# Patient Record
Sex: FEMALE | Race: WHITE
[De-identification: names, ages, dates, MRNs, and addresses within clinical notes are randomized per-mention and may not be internally consistent; named-entity substitution may affect disease eponyms.]

---

## 2017-06-22 ENCOUNTER — HOSPITAL ENCOUNTER (OUTPATIENT)
Dept: HOSPITAL 25 - OREAST | Age: 67
Discharge: HOME | End: 2017-06-22
Attending: ORTHOPAEDIC SURGERY
Payer: MEDICARE

## 2017-06-22 VITALS — SYSTOLIC BLOOD PRESSURE: 118 MMHG | DIASTOLIC BLOOD PRESSURE: 62 MMHG

## 2017-06-22 DIAGNOSIS — F17.210: ICD-10-CM

## 2017-06-22 DIAGNOSIS — G40.909: ICD-10-CM

## 2017-06-22 DIAGNOSIS — Z88.5: ICD-10-CM

## 2017-06-22 DIAGNOSIS — Z79.82: ICD-10-CM

## 2017-06-22 DIAGNOSIS — Z88.1: ICD-10-CM

## 2017-06-22 DIAGNOSIS — M72.0: Primary | ICD-10-CM

## 2017-06-22 PROCEDURE — 88305 TISSUE EXAM BY PATHOLOGIST: CPT

## 2017-06-23 NOTE — OP
DATE OF OPERATION:  06/22/17 - [facility]

 

DATE OF BIRTH:  [birth year]

 

SURGEON:  Sunil Hernández MD

 

ASSISTANT:  None.

 

ANESTHESIOLOGIST:  Dr. Barnes.

 

ANESTHESIA:  General.

 

PRE-OP DIAGNOSIS:  Right palm mass.

 

POST-OP DIAGNOSIS:  Right palm mass.

 

OPERATIVE PROCEDURE:  Excision of right palm fascial mass, consistent with 
Dupuytren's nodule.

 

INDICATIONS:  Alexandria is a 67-year-old female who has a right palm nodule near 
the origin of the thenar muscles.  It was very symptomatic to her.  When she 
would use her cane, it would cause her lot of discomfort.  She also says that 
occasionally it would get bluish in appearance.  I sent her for an MRI, which 
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  I talked to her about just 
observing it versus excising it.  She said it was very symptomatic and she 
wanted it out, so we discussed the risks and benefits including the risk of 
damage to the motor branch and she wanted to proceed.

 

ESTIMATED BLOOD LOSS:  2 mL.

 

COMPLICATIONS:  None.

 

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

 

DESCRIPTION OF PROCEDURE:  Alexandria was seen in the preoperative holding area.  
The correct side, site, and procedure were identified.  We came back to the 
operating room, where the arm was prepped and draped in the usual fashion and 
formal time-out was performed.

 

I made a V-shaped incision and raised a full-thickness flap right off of the 
nodule.  It was indeed a thickened area of the palmar fascia.  I found clean 
healthy margins and began the excision proximally.  It was raised deeply 
straight off the subcutaneous tissue taking great care to preserve any deep 
neurovascular structures.  Once the mass was fully excised, I went ahead and 
passed it off.  The wound was copiously irrigated.  I excised just a bit more 
of the palmar fascia proximally.  I was very satisfied with the excision at 
this point, so I went ahead and irrigated out the wound.  The skin was closed 
with 4-0 nylon suture.  The operative area was infiltrated with 0.25% Marcaine.
  Wound was dressed with Xeroform, 4x4, sterile Webril and an Ace wrap.  
Tourniquet was deflated.  The arm had been exsanguinated with the Esmarch and 
the tourniquet inflated to 250 mmHg prior to making skin incision.  The hand 
pinked up immediately.  She was then taken to recovery room in stable condition.

 

 [id number]/[facility] #: [id number]

DONNA

## 2018-09-19 ENCOUNTER — HOSPITAL ENCOUNTER (EMERGENCY)
Dept: HOSPITAL 25 - ED | Age: 68
Discharge: HOME | End: 2018-09-19
Payer: MEDICARE

## 2018-09-19 VITALS — DIASTOLIC BLOOD PRESSURE: 55 MMHG | SYSTOLIC BLOOD PRESSURE: 102 MMHG
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
## 2018-09-19 DIAGNOSIS — M25.532: Primary | ICD-10-CM
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
## 2018-09-19 DIAGNOSIS — Z88.3: ICD-10-CM

## 2018-09-19 DIAGNOSIS — F17.210: ICD-10-CM

## 2018-09-19 DIAGNOSIS — Z88.5: ICD-10-CM

## 2018-09-19 PROCEDURE — 99281 EMR DPT VST MAYX REQ PHY/QHP: CPT

## 2018-09-19 NOTE — ED
Upper Extremity Pain





- HPI Summary


HPI Summary: 





Patient is a 68-year-old female presenting to the ED with a left wrist injury.  

She states she was mowing the lawn approximately 1 hour PTA when she had a 

FOOSH injury and is identifying pain directly over the dorsum of the wrist.  

She states she is unable to flex or extend at the wrist.  Denies any forearm or 

elbow pain.  Denies any pain to the MCP joints or the fingertips.  Denies any 

pain to the thumb.  Denies any numbness or tingling, no color temperature 

changes are noted.  She has never injured this area before.  She has not taken 

medications PTA.





- History of Current Complaint


Chief Complaint: EDExtremityUpper


Stated Complaint: LT WRIST INJURY


Time Seen by Provider: 09/19/18 18:23


Hx Obtained From: Patient


Mechanism Of Injury: Other - FOOSH injury


Onset/Duration: Started Minutes Ago


Timing: Constant


Severity Initially: Moderate


Severity Currently: Moderate


Pain Location: Wrist


Character: Aching


Aggravating Factor(s): Movement, Lifting, Extension


Alleviating Factor(s): Rest, Ice


Associated Signs & Symptoms: Negative: Swelling, Redness, Bruising, Numbness/

Tingling, Chest Pain, Diaphoresis, Nausea, Vomiting


Related History: Dominant Hand Right





- Risk Factors


Non-Orthopedic Risk Factor: Negative


DVT Risk Factors: Negative


Septic Arthritis Risk Factor: Negative


Compartment Syndrome Risk Factors: Pain





- Allergies/Home Medications


Allergies/Adverse Reactions: 


 Allergies











Allergy/AdvReac Type Severity Reaction Status Date / Time


 


MS Codeine [Codeine] AdvReac Mild GI Upset Verified 05/22/17 15:59


 


MS Erythromycin AdvReac Mild GI Upset Verified 05/22/17 15:59





[Erythromycin]     














PMH/Surg Hx/FS Hx/Imm Hx


Previously Healthy: Yes


Endocrine/Hematology History: 


   Denies: Hx Anticoagulant Therapy, Hx Diabetes, Hx Thyroid Disease


Cardiovascular History: Reports: Other Cardiovascular Problems/Disorders - 

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


   Denies: Hx Angina, Hx Coronary Artery Disease, Hx Hypercholesterolemia, Hx 

Hypertension, Hx Myocardial Infarction, Hx Pacemaker/ICD, Hx Peripheral 

Vascular Disease, Hx Valvular Heart Disease


Respiratory History: Reports: Hx Sleep Apnea


   Denies: Hx Asthma, Hx Chronic Obstructive Pulmonary Disease (COPD)


GI History: Reports: Hx Gall Bladder Disease - Cholecystectomy, Hx 

Gastroesophageal Reflux Disease, Hx Irritable Bowel, Other GI Disorders - 

ULCERATIVE COLITIS


   Denies: Hx Ulcer


 History: 


   Denies: Hx Renal Disease


Musculoskeletal History: 


   Denies: Hx Arthritis, Hx Osteoporosis


Sensory History: Reports: Hx Cataracts - bilateral IOL, Hx Contacts or Glasses 

- glasses


   Denies: Hx Glaucoma, Hx Hearing Aid


Opthamlomology History: Reports: Hx Cataracts - bilateral IOL, Hx Contacts or 

Glasses - glasses


   Denies: Hx Glaucoma


Neurological History: Reports: Hx Headaches, Hx Migraine - on meds, Hx Seizures 

- on meds, Other Neuro Impairments/Disorders - hx of suicide attempts - last 

one 4/19/16


   Denies: Hx Dementia, Hx Transient Ischemic Attacks (TIA)


Psychiatric History: Reports: Hx Anxiety - on meds, Hx Depression - on meds, Hx 

Inpatient Treatment, Hx Suicide Attempt


   Denies: Hx Eating Disorder, Hx Panic Disorder, Hx of Violent Episodes 

Against Others, Hx Substance Abuse





- Cancer History


Hx Chemotherapy: No


Hx Radiation Therapy: No





- Surgical History


Surgery Procedure, Year, and Place: Tracheostomy 1971.  Hysterectomy 1993   

ADDITIONAL ABD SURG POST HYSTERECTOMY COMPLICATIONS - ADHESIONS.  

Cholecystectomy 1993.  UTERINE SUSPENSION - 1970s.  BILATERAL CATATRACTS - 

2010.  CARDIAC CATH - NO STENTS - 1993 - CAUTERIZE NODE ON HEART


Hx Anesthesia Reactions: No





- Immunization History


Hx Pertussis Vaccination: No


Immunizations Up to Date: Yes


Infectious Disease History: No


Infectious Disease History: 


   Denies: Hx Hepatitis, Hx Human Immunodeficiency Virus (HIV), Traveled 

Outside the [place] in Last 30 Days





- Family History


Known Family History: Positive: None, Other - breast cancer





- Social History


Occupation: Unemployed


Lives: With Family


Alcohol Use: Rare


Hx Substance Use: No


Substance Use Type: Reports: None


Hx Tobacco Use: Yes


Smoking Status (MU): Light Every Day Tobacco Smoker


Type: Cigarettes


Amount Used/How Often: 1/2 ppd for 40 years


Have You Smoked in the Last Year: Yes





Review of Systems


Constitutional: Negative


Negative: Fever, Chills, Fatigue, Skin Diaphoresis


Negative: Palpitations, Chest Pain


Negative: Shortness Of Breath, Cough


Genitourinary: Negative


Positive: no symptoms reported, see HPI


Positive: Arthralgia - left wrist pain


Positive: Other


Neurological: Negative


All Other Systems Reviewed And Are Negative: Yes





Physical Exam


Triage Information Reviewed: Yes


Vital Signs On Initial Exam: 


 Initial Vitals











Temp Pulse Resp BP Pulse Ox


 


 97.2 F   76   18   102/55   97 


 


 09/19/18 18:31  09/19/18 18:31  09/19/18 18:31  09/19/18 18:31  09/19/18 18:31











Vital Signs Reviewed: Yes


Appearance: Positive: Well-Appearing, Well-Nourished


Skin: Positive: Warm, Skin Color Reflects Adequate Perfusion


Head/Face: Positive: Normal Head/Face Inspection


Eyes: Positive: EOMI, SRIDEVI, Conjunctiva Clear


Neck: Positive: Supple, No Lymphadenopathy


Respiratory/Lung Sounds: Positive: Clear to Auscultation, Breath Sounds Present


Cardiovascular: Positive: RRR, Pulses are Symmetrical in both Upper and Lower 

Extremities


Musculoskeletal: Positive: Pain @ - left dorsum of the wrist - unable to flex 

or extend d/t pain - good cap refill/ pulses +2 intact bilaterally


Neurological: Positive: Speech Normal


Psychiatric: Positive: Affect/Mood Appropriate


AVPU Assessment: Alert





Diagnostics





- Vital Signs


 Vital Signs











  Temp Pulse Resp BP Pulse Ox


 


 09/19/18 18:31  97.2 F  76  18  102/55  97














- Laboratory


Lab Statement: Any lab studies that have been ordered have been reviewed, and 

results considered in the medical decision making process.





- Radiology


  ** No standard instances


Xray Interpretation: No Acute Changes


Radiology Interpretation Completed By: ED Physician - Read by KATTY Rodríguez.  Negative for any acute fracture.





Course/Dx





- Course


Course Of Treatment: Patient is evaluated for left wrist injury.  Left wrist x-

ray obtained and read by KATTY Rodríguez as negative for any acute fracture.  

She will still be placed in a cockup wrist splint at this time and will follow-

up with orthopedics if any symptoms worsen.  I have also discussed with the 

patient we will call tomorrow morning if there is any discrepancy with the 

radiology read report in the a.m.  She is given ibuprofen with minimal 

improvement.





- Diagnoses


Differential Diagnosis/HQI/PQRI: Positive: Fracture (Closed), Strain, Sprain


Provider Diagnoses: 


 Pain in left wrist








Discharge





- Sign-Out/Discharge


Documenting (check all that apply): Patient Departure





- Discharge Plan


Condition: Stable


Disposition: HOME


Referrals: 


Max Syed MD [Primary Care Provider] - 


Sunil Hernández MD [Medical Doctor] - 


Additional Instructions: 


Please follow-up with Orthopedics if any symptoms worsen


Keep the splint applied for comfort


Ice to the area


Ibuprofen 600 mg 3 times daily





- Billing Disposition and Condition


Condition: STABLE


Disposition: Home

## 2018-09-20 NOTE — RAD
Indication: Left wrist pain



3 views of the wrist demonstrates no fracture. No other bone or joint abnormality is

identified.



IMPRESSION: NO FRACTURE OF THE WRIST IS NOTED.



R0

## 2018-12-01 ENCOUNTER — HOSPITAL ENCOUNTER (OUTPATIENT)
Dept: HOSPITAL 25 - ED | Age: 68
Setting detail: OBSERVATION
LOS: 2 days | Discharge: HOME | End: 2018-12-03
Attending: INTERNAL MEDICINE | Admitting: HOSPITALIST
Payer: MEDICARE

## 2018-12-01 DIAGNOSIS — Z87.19: ICD-10-CM

## 2018-12-01 DIAGNOSIS — R19.7: ICD-10-CM

## 2018-12-01 DIAGNOSIS — G43.909: ICD-10-CM

## 2018-12-01 DIAGNOSIS — E78.5: ICD-10-CM

## 2018-12-01 DIAGNOSIS — F32.9: ICD-10-CM

## 2018-12-01 DIAGNOSIS — Z90.710: ICD-10-CM

## 2018-12-01 DIAGNOSIS — Z79.82: ICD-10-CM

## 2018-12-01 DIAGNOSIS — F17.210: ICD-10-CM

## 2018-12-01 DIAGNOSIS — R10.9: ICD-10-CM

## 2018-12-01 DIAGNOSIS — Z88.6: ICD-10-CM

## 2018-12-01 DIAGNOSIS — R55: Primary | ICD-10-CM

## 2018-12-01 DIAGNOSIS — G40.909: ICD-10-CM

## 2018-12-01 LAB
BASOPHILS # BLD AUTO: 0.1 10^3/UL (ref 0–0.2)
EOSINOPHIL # BLD AUTO: 0.1 10^3/UL (ref 0–0.6)
HCT VFR BLD AUTO: 44 % (ref 35–47)
HGB BLD-MCNC: 14.9 G/DL (ref 12–16)
LYMPHOCYTES # BLD AUTO: 1.2 10^3/UL (ref 1–4.8)
MCH RBC QN AUTO: 33 PG (ref 27–31)
MCHC RBC AUTO-ENTMCNC: 34 G/DL (ref 31–36)
MCV RBC AUTO: 97 FL (ref 80–97)
MONOCYTES # BLD AUTO: 0.5 10^3/UL (ref 0–0.8)
NEUTROPHILS # BLD AUTO: 4.8 10^3/UL (ref 1.5–7.7)
NRBC # BLD AUTO: 0 10^3/UL
NRBC BLD QL AUTO: 0
PLATELET # BLD AUTO: 193 10^3/UL (ref 150–450)
RBC # BLD AUTO: 4.54 10^6/UL (ref 4–5.4)
WBC # BLD AUTO: 6.7 10^3/UL (ref 3.5–10.8)
WBC UR QL AUTO: (no result)

## 2018-12-01 PROCEDURE — 96374 THER/PROPH/DIAG INJ IV PUSH: CPT

## 2018-12-01 PROCEDURE — 96375 TX/PRO/DX INJ NEW DRUG ADDON: CPT

## 2018-12-01 PROCEDURE — 74176 CT ABD & PELVIS W/O CONTRAST: CPT

## 2018-12-01 PROCEDURE — 81015 MICROSCOPIC EXAM OF URINE: CPT

## 2018-12-01 PROCEDURE — 99406 BEHAV CHNG SMOKING 3-10 MIN: CPT

## 2018-12-01 PROCEDURE — 83690 ASSAY OF LIPASE: CPT

## 2018-12-01 PROCEDURE — 84146 ASSAY OF PROLACTIN: CPT

## 2018-12-01 PROCEDURE — 83735 ASSAY OF MAGNESIUM: CPT

## 2018-12-01 PROCEDURE — 96372 THER/PROPH/DIAG INJ SC/IM: CPT

## 2018-12-01 PROCEDURE — 36415 COLL VENOUS BLD VENIPUNCTURE: CPT

## 2018-12-01 PROCEDURE — 70496 CT ANGIOGRAPHY HEAD: CPT

## 2018-12-01 PROCEDURE — 96376 TX/PRO/DX INJ SAME DRUG ADON: CPT

## 2018-12-01 PROCEDURE — 87086 URINE CULTURE/COLONY COUNT: CPT

## 2018-12-01 PROCEDURE — 86140 C-REACTIVE PROTEIN: CPT

## 2018-12-01 PROCEDURE — 81003 URINALYSIS AUTO W/O SCOPE: CPT

## 2018-12-01 PROCEDURE — 93005 ELECTROCARDIOGRAM TRACING: CPT

## 2018-12-01 PROCEDURE — 84443 ASSAY THYROID STIM HORMONE: CPT

## 2018-12-01 PROCEDURE — 84100 ASSAY OF PHOSPHORUS: CPT

## 2018-12-01 PROCEDURE — 95816 EEG AWAKE AND DROWSY: CPT

## 2018-12-01 PROCEDURE — 80053 COMPREHEN METABOLIC PANEL: CPT

## 2018-12-01 PROCEDURE — G0378 HOSPITAL OBSERVATION PER HR: HCPCS

## 2018-12-01 PROCEDURE — 83605 ASSAY OF LACTIC ACID: CPT

## 2018-12-01 PROCEDURE — 93306 TTE W/DOPPLER COMPLETE: CPT

## 2018-12-01 PROCEDURE — 84484 ASSAY OF TROPONIN QUANT: CPT

## 2018-12-01 PROCEDURE — 96361 HYDRATE IV INFUSION ADD-ON: CPT

## 2018-12-01 PROCEDURE — 82150 ASSAY OF AMYLASE: CPT

## 2018-12-01 PROCEDURE — 85025 COMPLETE CBC W/AUTO DIFF WBC: CPT

## 2018-12-01 PROCEDURE — 85379 FIBRIN DEGRADATION QUANT: CPT

## 2018-12-01 PROCEDURE — 99284 EMERGENCY DEPT VISIT MOD MDM: CPT

## 2018-12-01 PROCEDURE — 70498 CT ANGIOGRAPHY NECK: CPT

## 2018-12-01 RX ADMIN — ALPRAZOLAM SCH MG: 0.5 TABLET ORAL at 20:05

## 2018-12-01 RX ADMIN — HEPARIN SODIUM SCH UNITS: 5000 INJECTION INTRAVENOUS; SUBCUTANEOUS at 20:27

## 2018-12-01 RX ADMIN — MIRTAZAPINE SCH MG: 15 TABLET, FILM COATED ORAL at 20:05

## 2018-12-01 RX ADMIN — TOPIRAMATE SCH MG: 25 TABLET, FILM COATED ORAL at 20:05

## 2018-12-01 RX ADMIN — ACETAMINOPHEN PRN MG: 325 TABLET ORAL at 21:57

## 2018-12-01 NOTE — ED
Abdominal Pain/Female





- HPI Summary


HPI Summary: 


Patient is a 69 y/o F brought in by ambulance with complaints of lower 

abdominal pain, loose stools, nausea and syncopal episode. She reports Hx of 

chronic abdominal pain, woke up with severe abdominal pain this morning that is 

similar to her chronic pain. Pain is described as cramping. Patient states that 

she was going to the kitchen to get her dogs water, states that she woke up on 

the floor. She states that she is unsure of duration of syncope. Patient called 

EMS. EMS reported , o2 100. Chest pain, SOB, blurred vision is denied. 

Patient states she has received multiple diagnoses for her abdominal pain from 

multiple doctors, including IBS, GERD, and "something-colitis". She notes HA, 

but reports Hx of headaches. In the room, pain is rated 9/10. Patient is unsure 

of date of her last CT scan. On triage, nothing is noted to aggravate/alleviate 

Sx. Home medications and allergies are reviewed. 





- History of Current Complaint


Stated Complaint: ABD PAIN


Hx Obtained From: Patient


Onset/Duration: Lasting Hours - episode onset this morning, Still Present


Timing: Hours - episode onset this morning


Severity Currently: Severe - 9/10


Pain Intensity: 9


Pain Scale Used: 0-10 Numeric - 9/10


Location: Other - lower abdomen


Character: Cramping


Aggravating Factor(s): Nothing


Alleviating Factor(s): Nothing


Associated Signs and Symptoms: Positive: Nausea, Other: - POSITIVE - LOOSE 

STOOLS, SYNCOPAL EPISODE, HA; NEGATIVE - SOB, BLURRED VISION.  Negative: Chest 

Pain


Allergies/Adverse Reactions: 


 Allergies











Allergy/AdvReac Type Severity Reaction Status Date / Time


 


codeine AdvReac  GI Upset Verified 12/01/18 17:14


 


erythromycin base AdvReac  GI Upset Verified 12/01/18 17:14











Home Medications: 


 Home Medications





Pantoprazole TAB (NF) [Protonix TAB (NF)] 40 mg PO DAILY 12/01/18 [History 

Confirmed 12/01/18]











PMH/Surg Hx/FS Hx/Imm Hx


Endocrine/Hematology History: 


   Denies: Hx Anticoagulant Therapy, Hx Diabetes, Hx Thyroid Disease


Cardiovascular History: Reports: Other Cardiovascular Problems/Disorders - 

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


   Denies: Hx Angina, Hx Coronary Artery Disease, Hx Hypercholesterolemia, Hx 

Hypertension, Hx Myocardial Infarction, Hx Pacemaker/ICD, Hx Peripheral 

Vascular Disease, Hx Valvular Heart Disease


Respiratory History: Reports: Hx Sleep Apnea


   Denies: Hx Asthma, Hx Chronic Obstructive Pulmonary Disease (COPD)


GI History: Reports: Hx Gall Bladder Disease - Cholecystectomy, Hx 

Gastroesophageal Reflux Disease, Hx Irritable Bowel, Other GI Disorders - 

ULCERATIVE COLITIS


   Denies: Hx Ulcer


 History: 


   Denies: Hx Renal Disease


Musculoskeletal History: 


   Denies: Hx Arthritis, Hx Osteoporosis


Sensory History: Reports: Hx Cataracts - bilateral IOL, Hx Contacts or Glasses 

- glasses


   Denies: Hx Glaucoma, Hx Hearing Aid


Opthamlomology History: Reports: Hx Cataracts - bilateral IOL, Hx Contacts or 

Glasses - glasses


   Denies: Hx Glaucoma


Neurological History: Reports: Hx Headaches, Hx Migraine - on meds, Hx Seizures 

- on meds, Other Neuro Impairments/Disorders - hx of suicide attempts - last 

one 4/19/16


   Denies: Hx Dementia, Hx Transient Ischemic Attacks (TIA)


Psychiatric History: Reports: Hx Anxiety - on meds, Hx Depression - on meds, Hx 

Inpatient Treatment, Hx Suicide Attempt


   Denies: Hx Eating Disorder, Hx Panic Disorder, Hx of Violent Episodes 

Against Others, Hx Substance Abuse





- Cancer History


Hx Chemotherapy: No


Hx Radiation Therapy: No





- Surgical History


Surgery Procedure, Year, and Place: Tracheostomy 1971.  Hysterectomy 1993   

ADDITIONAL ABD SURG POST HYSTERECTOMY COMPLICATIONS - ADHESIONS.  

Cholecystectomy 1993.  UTERINE SUSPENSION - 1970s.  BILATERAL CATATRACTS - 

2010.  CARDIAC CATH - NO STENTS - 1993 - CAUTERIZE NODE ON HEART


Hx Anesthesia Reactions: No


Infectious Disease History: 


   Denies: Hx Hepatitis, Hx Human Immunodeficiency Virus (HIV)





- Family History


Known Family History: Positive: Other - breast cancer





- Social History


Alcohol Use: Rare


Hx Substance Use: No


Substance Use Type: Reports: None


Hx Tobacco Use: Yes


Smoking Status (MU): Light Every Day Tobacco Smoker


Type: Cigarettes


Amount Used/How Often: 1/2 ppd for 40 years


Have You Smoked in the Last Year: Yes





Review of Systems


Negative: Blurred Vision


Negative: Chest Pain


Negative: Shortness Of Breath


Positive: Abdominal Pain, Nausea, Other - POSITIVE - LOOSE STOOLS 


Positive: Headache, Syncope


All Other Systems Reviewed And Are Negative: Yes





Physical Exam





- Summary


Physical Exam Summary: 


VITAL SIGNS: Reviewed. 


GENERAL: Patient is a well-developed and nourished female who is lying 

comfortable in the stretcher. Patient is not in any acute respiratory distress. 


HEAD AND FACE: Normocephalic and atraumatic. 


EYES: PERRLA, EOMI x 2, No injected conjunctiva.


EARS: Hearing grossly intact. Ear canals and tympanic membranes are WNL.


MOUTH: Oropharynx within normal limits. 


NECK: Supple, trachea is midline, no adenopathy, no JVD.


CHEST: Symmetric, no tenderness at palpation 


LUNGS: Clear to auscultation bilaterally. No wheezing or crackles.


CVS: RRR, S1 and S2 present, no murmurs or gallops appreciated. 


ABDOMEN: Soft, diffuse abdominal tender. No signs of distention. Positive bowel 

sounds. No rebound no guarding, and no masses palpated. No abdominal bruit or 

pulsations. 


EXTREMITIES: FROM in all major joints, no edema, no cyanosis or clubbing.


NEURO: Alert and oriented x 3. No acute neurological deficits. Speech is normal.


SKIN: Dry and warm








Triage Information Reviewed: Yes


Vital Signs On Initial Exam: 





 Initial Vitals











Pulse BP Pulse Ox


 


 58   125/72   95 


 


 12/01/18 14:26  12/01/18 14:26  12/01/18 14:26











Vital Signs Reviewed: Yes





Diagnostics





- Laboratory


Result Diagrams: 


 12/01/18 14:38





 12/01/18 14:38


Lab Statement: Any lab studies that have been ordered have been reviewed, and 

results considered in the medical decision making process.





- CT


  ** ABD/PEL CT


CT Interpretation Completed By: Radiologist


Summary of CT Findings: IMPRESSION:  1. NO EVIDENCE FOR ACUTE FINDING.  2. 

STATUS POST CHOLECYSTECTOMY THERE IS INTRA AND EXTRAHEPATIC DUCTAL DISTENTION 

WHICH IS.  UNCHANGED.  3. COMPLEX CYSTIC AND SOLID LESION IN THE LEFT KIDNEY. 

RECOMMEND AN OUTPATIENT MULTIPHASE.  CONTRAST-ENHANCED CT OF THE KIDNEYS FOR 

FURTHER EVALUATION.  4. BILATERAL NONOBSTRUCTING RENAL CALCULI.  5. STATUS POST 

HYSTERECTOMY.  THIS REPORT WAS REVIEWED BY ED PHYSICIAN.





- EKG


  ** 1442


Cardiac Rate: Bradycardia - rate of 52 bpm


EKG Rhythm: Sinus Bradycardia


EKG Comparison: No Significant Change - similar to EKG from 12/16/16


Summary of EKG Findings: EKG showed sinus bradycardia with rate of 52 bpm, no 

ST elevation, similar to EKG from 12/16/16





Abdominal Pain Fem Course/Dx





- Course


Course Of Treatment: Patient is a 69 y/o F brought in by ambulance with 

complaints of lower abdominal pain, loose stools, nausea and syncopal episode. 

She reports Hx of chronic abdominal pain, woke up with severe abdominal pain 

this morning that is similar to her chronic pain. Pain is described as 

cramping. Patient states that she was going to the kitchen to get her dogs water

, states that she woke up on the floor. She states that she is unsure of 

duration of syncope. Patient called EMS. EMS reported , o2 100. Chest pain

, SOB, blurred vision is denied. Patient states she has received multiple 

diagnoses for her abdominal pain from multiple doctors, including IBS, GERD, 

and "something-colitis". She notes HA, but reports Hx of headaches. In the room

, pain is rated 9/10. Patient is unsure of date of her last CT scan. On triage, 

nothing is noted to aggravate/alleviate Sx. Home medications and allergies are 

reviewed.  Blood work without any significant abnormality except for glucose 116

, lipase 165.  Abdominal pelvic CT impression: IMPRESSION:  1. NO EVIDENCE FOR 

ACUTE FINDING.  2. STATUS POST CHOLECYSTECTOMY THERE IS INTRA AND EXTRAHEPATIC 

DUCTAL DISTENTION WHICH IS.  UNCHANGED.  3. COMPLEX CYSTIC AND SOLID LESION IN 

THE LEFT KIDNEY. RECOMMEND AN OUTPATIENT MULTIPHASE.  CONTRAST-ENHANCED CT OF 

THE KIDNEYS FOR FURTHER EVALUATION.  4. BILATERAL NONOBSTRUCTING RENAL CALCULI.

  5. STATUS POST HYSTERECTOMY.  It seems that the etiology of the pain may be 

due to that patient is developing acute pancreatitis.  The patient has been 

given 2 doses of morphine, zofran for the nausea and vomiting.  Since the 

patient continues to have pain I discussed my physical exam and findings with 

Dr. Mora from the hospital services, Dr. Mora accepted the patient for 

admission. The patient is hemodynamically stable.





- Diagnoses


Provider Diagnoses: 


 Pancreatitis








- Provider Notifications


Discussed Care Of Patient With: Miesha Mora


Time Discussed With Above Provider: 15:55


Instructed by Provider To: Other - Patient's case was discussed with Dr. Mora. Dr. Mora accepts patient for admission.





Discharge





- Sign-Out/Discharge


Documenting (check all that apply): Patient Departure - admit 





- Discharge Plan


Condition: Stable


Disposition: ADMITTED TO [facility]


Referrals: 


Max Syed MD [Primary Care Provider] - 





- Billing Disposition and Condition


Condition: STABLE


Disposition: Admitted to [facility]





- Attestation Statements


Document Initiated by Scribe: Yes


Documenting Scribe: ROSI NAVARRO 


Provider For Whom Scribe is Documenting (Include Credential): PALMA RAVI MD


Scribe Attestation: 


I, ROSI NAVARRO , scribed for PALMA RAVI MD on 12/01/18 at 1838. 


Scribe Documentation Reviewed: Yes


Provider Attestation: 


The documentation as recorded by the [email or web address] lagos  accurately reflects 

the service I personally performed and the decisions made by me, PALMA RAVI MD


Status of Scribe Document: Viewed

## 2018-12-01 NOTE — XMS REPORT
Continuity of Care Document (CCD)

 Created on:2018



Patient:Alexandria Larios

Sex:Female

:1950

External Reference #:[id number]





Demographics







 Address  [address]



   [place] [address]

 

 Home Phone  [phone]

 

 Mobile Phone  [phone]

 

 Preferred Language  en

 

 Marital Status  Not  or 

 

 Hoahaoism Affiliation  Unknown

 

 Race  White

 

 Ethnic Group  Not  or 









Author







 Name  Shayy Forbes MD

 

 Address  [address]



   Unavailable



   [place] [address]









Care Team Providers







 Name  Role  Phone

 

 Max Syed MD  Care Team Information   Unavailable

 

 Max Syed MD  Primary Care Physician  Unavailable









Payers







 Type  Date  Identification Numbers  Payment Provider  Subscriber

 

     Policy Number: [id number]  Medicare  Alexandria Larios









 PayID: [id number]  [facility]









 [address]

 

 [facility] [place] [address]









     Policy Number: [id number]  Medicaid/Medicare  Alexandria Larios









 Group Name: 2 1  [facility] [employer]

 

 PayID: [id number]  [address]









 [place] [address]







Advance Directives







 Description

 

 No Information Available







Problems







 Description

 

 No Information







Family History







 Description

 

 No Information Available







Social History







 Type  Date  Description  Comments

 

 Birth Sex    Unknown  

 

 Tobacco Use  Start: Unknown  Patient is a current smoker, smokes every  



     day  

 

 Smoking Status  Reviewed: 18  Patient is a current smoker, smokes every  



     day  







Allergies, Adverse Reactions, Alerts







 Date  Description  Reaction  Status  Severity  Comments

 

 10/24/2018  Erythromycin    Active    

 

 11/15/2018  Codeine    Active    







Medications







 Medication  Date  Status  Form  Strength  Qnty  SIG  Indications  Ordering



                 Provider

 

 Pantoprazole  /  Active  Tablets DR  40mg  30tabs  take 1    Shayy



 Sodium  2018          tablet    Foor-Noble



             daily.    MD epifanio



             take    



             30-60    



             minutes    



             before    



             eating.    

 

 Rosuvastatin  10/20/  Active  Tablets  10mg  90tabs    E78.4  Suzette,



 Calcium                FAITH Jonas

 

 Aspirin Ec  10/20/  Active  Tablets DR  81mg      E78.4  Candie Syed



                 MD fatimah

 

 Topamax  /  Active  Tablets  50mg  60tabs      Unknown



   0000              

 

 Lomotil  /  Active  Tablets  2.5-0.025m        Unknown



   0000      g        

 

 Robinul  /  Active  Tablets  1mg        Unknown



   0000              

 

 Alprazolam  /  Active  Tablets  0.5mg        Unknown



   0000              

 

 Desvenlafaxine  /  Active  Tablets ER  100mg        Unknown



 ER  0000    24HR          

 

                 

 

 Dexilant  /  Hx  Capsules  60mg        Unknown



   0000 -    DR          



   2018              

 

 Amitriptyline  /  Hx  Tablets  50mg        Unknown



 HCL  0000 -              



   2018              







Immunizations







 Description

 

 No Information Available







Vital Signs







 Date  Vital  Result  Comment

 

 2018  1:21pm  Height  62 inches  5'2"









 Weight  143.00 lb  

 

 BP Systolic  114 mmHg  

 

 BP Diastolic  70 mmHg  

 

 Heart Rate  76 /min  

 

 BMI (Body Mass Index)  26.2 kg/m2  







Results







 Test  Date  Facility  Test  Result  H/L  Range  Note

 

 Laboratory test  2016  N2N/CCD Import  Acetaminophen  < 15 ug/mL      1



 finding              









 Alcohol  < 10 mg/dL    <10  

 

 Amphetamine Ur Screen  None Detected    None Detect  

 

 Barbiturates Urine Screen  None Detected    None Detect  

 

 Benzodiazepine Urine Screen  Presumptive Posi <See Note>    None Detect  2

 

 Salicylate  < 2.50 mg/dL    <30  

 

 TSH (Thyroid Stim Horm)  1.23 ?IU/mL    0.34-5.60  

 

 Urine Cannabinoids Screen  None Detected    None Detect  

 

 Urine Cocaine Screen  None Detected    None Detect  

 

 Urine Culture And Sensitivities  See Result Below      3

 

 Urine Opiates Screen  None Detected    None Detect  

 

 Urine Phencyclidine Screen  None Detected    None Detect  4









 CBC Auto Diff  2016  N2N/CCD Import  Abs Basophils  0.1 10^3/uL    0-0.2
  









 Abs Eosinophils  0.2 10^3/uL    0-0.6  

 

 Abs Lymphocytes  2.3 10^3/uL    1.0-4.8  

 

 Abs Monocytes  0.4 10^3/uL    0-0.8  

 

 Abs Neutrophils  5.4 10^3/uL    1.5-7.7  

 

 Abs Nucleated RBC  0.01 10^3/uL      

 

 Basophil %  0.7 %    0-2  

 

 Eosinophil %  2.0 %    0-6  

 

 Granulocyte %  65.0 %    38-83  

 

 Hematocrit  44 %    35-47  

 

 Hemoglobin  14.1 g/dL    12.0-16.0  

 

 Lymphocyte %  27.5 %    25-47  

 

 Mean Corpuscular HGB Conc  32 g/dL    31-36  

 

 Mean Corpuscular Hemoglobin  32 pg  High  27-31  

 

 Mean Corpuscular Volume  100 fL  High  80-97  

 

 Mean Platelet Volume  10 um3    7.4-10.4  

 

 Monocyte %  4.8 %    1-9  

 

 Nucleated Red Blood Cells %  0.1 1      

 

 Platelet Count  281 10^3/uL    150-450  

 

 Red Blood Count  4.39 10^6/uL    4.0-5.4  

 

 Red Cell Distribution Width  14 %    10.5-15  

 

 White Blood Count  8.4 10^3/uL    3.5-10.8  









 Comp Metabolic Panel  2016  [employer]/eFolder Import  Albumin  4.5 g/dL    3.2-5.2
  









 Albumin/Globulin Ratio  1.7 1    1-3  

 

 Alkaline Phosphatase  85 U/L      

 

 Alt  11 U/L    7-52  

 

 Anion Gap  7 mmol/L    2-11  

 

 Ast  16 U/L    13-39  

 

 BUN/Creatinine Ratio  10.8 1    8-20  

 

 Blood Urea Nitrogen  10 mg/dL    6-24  

 

 Calcium  9.0 mg/dL    8.6-10.3  

 

 Chloride  104 mmol/L    101-111  

 

 Co2 Carbon Dioxide  27 mmol/L    22-32  

 

 Creatinine  0.93 mg/dL    0.51-0.95  

 

 Egfr   78.1 1    >60  5

 

 Egfr Non-  60.7 1    >60  

 

 Globulin  2.6 g/dL    2-4  

 

 Glucose  116 mg/dL  High    

 

 Potassium  3.4 mmol/L  Low  3.5-5.0  

 

 Sodium  138 mmol/L    133-145  

 

 Total Bilirubin  0.50 mg/dL    0.2-1.0  

 

 Total Protein  7.1 g/dL    6.4-8.9  









 Urinalysis Profile  2016  Exos/eFolder Import  Urine Appearance  Clear      









 Urine Bacteria  Absent    Absent  

 

 Urine Bilirubin  Negative    Negative  

 

 Urine Blood  Negative    Negative  

 

 Urine Color  Yellow      

 

 Urine Glucose  Negative    Negative  

 

 Urine Ketones  Negative    Negative  

 

 Urine Leukocytes  2+    Negative  

 

 Urine Nitrite  Negative    Negative  

 

 Urine Protein  Negative    Negative  

 

 Urine Red Blood Cell  Trace(0-2/hpf)    Absent  

 

 Urine Specific Gravity  1.004 1  Low  1.010-1.030  

 

 Urine Squamous Epithelial Cell  Present    Absent  

 

 Urine Urobilinogen  Negative    Negative  

 

 Urine White Blood Cell  1+(6-10/hpf)    Absent  

 

 Urine pH  6.0 1    5-9  









 Laboratory test  2015  [employer]/eFolder Import  C Reactive  < 1.00 mg/L    < 5.00
  6



 finding      Protein        









 Partial Thrombo Time PTT  28.7 s    26.0-36.3  









 CBC Auto Diff  2015  [employer]/eFolder Import  Abs Basophils  0.1 10^3/uL    0-0.2
  









 Abs Eosinophils  0.2 10^3/uL    0-0.6  

 

 Abs Lymphocytes  2.7 10^3/uL    1.0-4.8  

 

 Abs Monocytes  0.5 10^3/uL    0-0.8  

 

 Abs Neutrophils  3.5 10^3/uL    1.5-7.7  

 

 Abs Nucleated RBC  0.01 10^3/uL      

 

 Basophil %  0.8 %    0-2  

 

 Eosinophil %  2.3 %    0-6  

 

 Granulocyte %  50.6 %    38-83  

 

 Hematocrit  42 %    35-47  

 

 Hemoglobin  13.9 g/dL    12.0-16.0  

 

 Lymphocyte %  39.1 %    25-47  

 

 Mean Corpuscular HGB Conc  33 g/dL    31-36  

 

 Mean Corpuscular Hemoglobin  33 pg  High  27-31  

 

 Mean Corpuscular Volume  99 fL  High  80-97  

 

 Mean Platelet Volume  10 um3    7.4-10.4  

 

 Monocyte %  7.2 %    1-9  

 

 Nucleated Red Blood Cells %  0.1 1      

 

 Platelet Count  244 10^3/uL    150-450  

 

 Red Blood Count  4.25 10^6/uL    4.0-5.4  

 

 Red Cell Distribution Width  13 %    10.5-15  

 

 White Blood Count  6.9 10^3/uL    3.5-10.8  









 Comp Metabolic Panel  2015  N2N/CCD Import  Albumin  4.1 g/dL    3.2-5.2
  









 Albumin/Globulin Ratio  1.8 1    1-3  

 

 Alkaline Phosphatase  86 U/L      

 

 Alt  8 U/L    7-52  

 

 Anion Gap  6 mmol/L    2-11  

 

 Ast  14 U/L    13-39  

 

 BUN/Creatinine Ratio  10.2 1    8-20  

 

 Blood Urea Nitrogen  9 mg/dL    6-24  

 

 Calcium  9.3 mg/dL    8.6-10.3  

 

 Chloride  103 mmol/L    101-111  

 

 Co2 Carbon Dioxide  26 mmol/L    22-32  

 

 Creatinine  0.88 mg/dL    0.51-0.95  

 

 Egfr   83.2 1    >60  7

 

 Egfr Non-  64.7 1    >60  

 

 Globulin  2.3 g/dL    2-4  

 

 Glucose  95 mg/dL      

 

 Potassium  3.2 mmol/L  Low  3.5-5.0  

 

 Sodium  135 mmol/L    133-145  

 

 Total Bilirubin  0.30 mg/dL    0.2-1.0  

 

 Total Protein  6.4 g/dL    6.4-8.9  









 Inr/Protime  2015  N2N/CCD Import  Inr  1.01 1    0.89-1.11  

 

 Laboratory test finding  12/10/2013  N2N/CCD Import  Amylase  36 U/L    
  









 C Reactive Protein  < 0.5 mg/dL    Less than 0.5  

 

 Lipase  13 U/L  Low  22-51  









 CBC Auto Diff  12/10/2013  N2N/CCD Import  Abs Basophils  0.1 10^3/uL    0-0.2
  









 Abs Eosinophils  0.1 10^3/uL    0-0.6  

 

 Abs Lymphocytes  1.5 10^3/uL    1.0-4.8  

 

 Abs Monocytes  0.4 10^3/uL    0-0.8  

 

 Abs Neutrophils  3.3 10^3/uL    1.5-7.7  

 

 Abs Nucleated RBC  0 10^3/uL      

 

 Basophil %  1.1 %    0-2  

 

 Eosinophil %  2.2 %    0-6  

 

 Granulocyte %  61.0 %    38-83  

 

 Hematocrit  39 %    35-47  

 

 Hemoglobin  13.2 g/dL    12.0-16.0  

 

 Lymphocyte %  27.5 %    25-47  

 

 Mean Corpuscular HGB Conc  34 g/dL    31-36  

 

 Mean Corpuscular Hemoglobin  32 pg  High  27-31  

 

 Mean Corpuscular Volume  95 fL    80-97  

 

 Mean Platelet Volume  9 um3    7.4-10.4  

 

 Monocyte %  8.2 %    1-9  

 

 Nucleated Red Blood Cells %  0.1 1      

 

 Platelet Count  225 10^3/uL    150-450  

 

 Red Blood Count  4.11 10^6/uL    4.0-5.4  

 

 Red Cell Distribution Width  13 %    10.5-15  

 

 White Blood Count  5.5 10^3/uL    4.8-10.8  









 Comp Metabolic Panel  12/10/2013  N2N/eFolder Import  Albumin  4.0 g/dL    3.2-5.2
  









 Albumin/Globulin Ratio  1.7 1    1-3  

 

 Alkaline Phosphatase  105 U/L      

 

 Alt  15 U/L    14-54  

 

 Anion Gap  7.0 mmol/L    2-11  

 

 Ast  26 U/L    12-42  

 

 BUN/Creatinine Ratio  8.8 1    8-20  

 

 Blood Urea Nitrogen  7 mg/dL    6-24  

 

 Calcium  9.1 mg/dL    8.1-9.9  

 

 Chloride  102 mmol/L    101-111  

 

 Co2 Carbon Dioxide  27.0 mmol/L    22-32  

 

 Creatinine  0.80 mg/dL    0.50-1.40  

 

 Egfr   93.5 1    >60  8

 

 Egfr Non-  72.7 1    >60  

 

 Globulin  2.3 g/dL    2-4  

 

 Glucose  92 mg/dL      

 

 Potassium  3.4 mmol/L  Low  3.5-5.0  

 

 Sodium  136 mmol/L    133-145  

 

 Total Bilirubin  0.7 mg/dL    0.4-1.5  

 

 Total Protein  6.3 g/dL    6.2-8.1  









 1  Therapeutic concentration: <50 ug/mL



   Toxic concentration: >120 ug/mL

 

 2  Presumptive Positive



   Presumptive positive results are unconfirmed.

 

 3  SEE RESULT BELOW



   -----------------------------------------------------------------------------
---------------



   Name:  ALEXANDRIA LARIOS                  : 1950    Attend Dr: Aubrey Lawler MD



   Acct:  [id number]  Unit: [id number]  AGE: 65            Location:  [facility]    
    



   Re16                        SEX: F             Status:    ADM IN



   -----------------------------------------------------------------------------
---------------



   



   SPEC: [id number]         REZA:       [facility] DR: Shari VALLE



   REQ:  [id number]              RECD:   



   STATUS: KAYLA FAGAN DR: Max Lindsay MD



   _



   SOURCE: URINE          SPDESC:



   ORDERED:  Urine Culture



   



   -----------------------------------------------------------------------------
---------------



   Procedure                         Result                         Reported   
        Site



   -----------------------------------------------------------------------------
---------------



   Urine Culture  Final                                             16-
[id number]      ML



   No Growth (<1,000 CFU/mL)



   



   -----------------------------------------------------------------------------
---------------



   * ML - MAIN LAB ([facility])



   .



   



   



   



   



   



   



   



   



   



   



   



   



   



   



   



   



   



   



   



   



   



   



   



   



   



   



   ** END OF REPORT **



   



   * ML=Testing performed at Main Lab



   DEPARTMENT OF PATHOLOGY,  [address] [place] [address]



   Phone # [phone]      Fax #[phone]



   Cornelius White M.D. Director     [facility] # [id number]

 

 4  The urine specimen was tested at the listed cutoffs:



   Drug class                       test level



   (ng/mL)



   Amphetamines                        500



   Barbiturates                        200



   Benzodiazepine metabolites          200



   Cocaine metabolites                 150



   Cannabinoids                         50



   Opiates                             300



   Pcp                                  25



   



   Specimen was received without chain of custody. Results



   should be used for medical purposes only.

 

 5  *******Because ethnic data is not always readily available,



   this report includes an eGFR for both -Americans and



   non- Americans.****



   The National Kidney Disease Education Program (NKDEP) does



   not endorse the use of the MDRD equation for patients that



   are not between the ages of 18 and 70, are pregnant, have



   extremes of body size, muscle mass, or nutritional status,



   or are non- or non-.



   According to the National Kidney Foundation, irrespective of



   diagnosis, the stage of the disease is based on the level of



   kidney function:



   Stage Description                      GFR(mL/min/1.73 m(2))



   1     Kidney damage with normal or decreased GFR       90



   2     Kidney damage with mild decrease in GFR          60-89



   3     Moderate decrease in GFR                         30-59



   4     Severe decrease in GFR                           15-29



   5     Kidney failure                       <15 (or dialysis)

 

 6  Acute inflammation:  >10.00

 

 7  *******Because ethnic data is not always readily available,



   this report includes an eGFR for both -Americans and



   non- Americans.****



   The National Kidney Disease Education Program (NKDEP) does



   not endorse the use of the MDRD equation for patients that



   are not between the ages of 18 and 70, are pregnant, have



   extremes of body size, muscle mass, or nutritional status,



   or are non- or non-.



   According to the National Kidney Foundation, irrespective of



   diagnosis, the stage of the disease is based on the level of



   kidney function:



   Stage Description                      GFR(mL/min/1.73 m(2))



   1     Kidney damage with normal or decreased GFR       90



   2     Kidney damage with mild decrease in GFR          60-89



   3     Moderate decrease in GFR                         30-59



   4     Severe decrease in GFR                           15-29



   5     Kidney failure                       <15 (or dialysis)

 

 8  *******Because ethnic data is not always readily available,



   this report includes an eGFR for both -Americans and



   non- Americans.****



   The National Kidney Disease Education Program (NKDEP) does



   not endorse the use of the MDRD equation for patients that



   are not between the ages of 18 and 70, are pregnant, have



   extremes of body size, muscle mass, or nutritional status,



   or are non- or non-.



   According to the National Kidney Foundation, irrespective of



   diagnosis, the stage of the disease is based on the level of



   kidney function:



   Stage Description                      GFR(mL/min/1.73 m(2))



   1     Kidney damage with normal or decreased GFR       90



   2     Kidney damage with mild decrease in GFR          60-89



   3     Moderate decrease in GFR                         30-59



   4     Severe decrease in GFR                           15-29



   5     Kidney failure                       <15 (or dialysis)







Procedures







 Date  Code  Description  Status

 

 2004  73518  Colonoscopy  Completed







Encounters







 Description

 

 No Information Available







Plan of Treatment

Future Appointment(s):02/15/2019  3:45 pm - Shayy Forbes MD at 
[id number] - Shayy Forbes MDK21.9 
Gastro-esophageal reflux disease without vlzaxrdvqcwJ59.7 Diarrhea, 
htcyhvmwxrhM59.00 Constipation, qvdolilsrvfQ22.010 Personal history of colonic 
polyps

## 2018-12-01 NOTE — HP
CC:  Dr. Syed.*

 

HOSPITAL MEDICINE HISTORY AND PHYSICAL:

 

DATE OF ADMISSION:  18

 

PRIMARY CARE PHYSICIAN:  Dr. Syed.

 

ATTENDING PHYSICIAN:  Dr. Miesha Mora * (dictation provided by Monika Meadows NP
).

 

CHIEF COMPLAINT:  "I found myself on the floor."

 

HISTORY OF PRESENT ILLNESS:  Ms. Porter is a 68-year-old female with a past 
medical history of depression, pseudoseizures, migraines, suicidal ideation, 
who presents to the hospital today with concern for an episode of finding 
herself on the floor of unclear circumstance.  Ms. Porter states that she has 
had severe depression for all of her life, but most notably it had been seemed 
more severe in the past few weeks.  For the past few days, she has noted 
increased diarrhea.  She states that she has an uncertain gastroenterological 
problem by which she will have episodes of constipation alternating with 
episodes of diarrhea.  She states that this episode seemed similar to those 
episodes, but that it seemed to last longer than usual. She has had some nausea 
and vomiting, which is also consistent with these episodes. She denies any 
chest pain, shortness of breath, fever.  She has been trying to increase her 
oral intake.  She states that she is quite sedentary and spends all day sitting 
in a chair reading.  She states that she feels quite lonely and has minimal of 
support systems in the community.  She does follow with a therapist on a weekly 
basis.  Today, she got up at about 1 o'clock to go fix herself something to eat 
and the next thing she knew she found herself on the floor.  She had knocked 
over her dog's bowl of water.  Within about 45 minutes from that time period, 
EMS had arrived to bring her to the hospital for evaluation.  The patient is 
unclear how long she was on the ground and seemingly unconscious, but it seems 
that it was no longer than 45 minutes in total.

 

In the emergency room, Ms. Porter had labs that were unremarkable.  She does 
have a slight elevation in her lipase to 165.  She is denying abdominal pain at 
this time, although she has complained of abdominal pain intermittently since 
arrival.  She did confirm that this abdominal pain was consistent with her 
previous history of chronic abdominal pain.  She had an abdomen and pelvis CT, 
which showed no acute abnormalities.

 

PAST MEDICAL HISTORY:

1.  Depression, suicidal ideation.

2.  Chronic abdominal pain with alternating periods of constipation and 
diarrhea consistent with possible diagnosis of irritable bowel syndrome.

3.  Migraines.

4.  Cholecystitis.

5.  Hysterectomy.

6.  History of nonepileptic seizures.

 

MEDICATIONS:

1.  Pantoprazole 40 mg p.o. daily.

2.  Desvenlafaxine 50 mg p.o. q.p.m.

3.  Aspirin 81 mg p.o. q.a.m.

4.  Alprazolam 0.5 mg p.o. b.i.d.

5.  Topamax 50 mg p.o. b.i.d.

6.  Rosuvastatin 10 mg p.o. q.p.m.

7.  Ondansetron 4 mg p.o. q.6 hours p.r.n.

8.  Mirtazapine 7.5 mg p.o. at bedtime.

9.  Lomotil 1 tab p.o. 4 times a day p.r.n.

 

ALLERGIES:  To CODEINE and ERYTHROMYCIN BASE.

 

FAMILY HISTORY:  The patient states her mother is alive and well at 95.  Her 
dad  related to emphysema.

 

SOCIAL HISTORY:  The patient is a half a pack a day smoker.  She denies any 
regular alcohol use and she states that she drinks only very occasionally.  No 
reported drug use.  She is unable to name her healthcare proxy.

 

REVIEW OF SYSTEMS:  A 14-point review of systems was completed with Ms. Porter 
and all those not mentioned above were negative.

 

                               PHYSICAL EXAMINATION

 

GENERAL:  Ms. Porter is lying in the bed.  She is in no acute distress.

 

VITAL SIGNS:  Temperature 97.7, pulse rate 55, respiratory rate 12, O2 
saturation 100% on room air, blood pressure 107/69.

 

HEART:  S1, S2.  No murmur, rub or gallop, and regular.

 

ABDOMEN:  Soft, nontender.  Bowel sounds positive x4.

 

EXTREMITIES:  No cyanosis or edema.

 

SKIN:  Intact.

 

NEURO:  She is alert.  She is oriented x3.  She moves all extremities equally. 
There is no facial asymmetry or focal weakness.  Extraocular movements are 
intact.

 

 DIAGNOSTIC STUDIES/LAB DATA:  Sodium 139, potassium 3.8, chloride 108, serum 
bicarbonate 25, BUN 13, creatinine 0.97, glucose 116.  Lactic acid 1.1.  
Troponin 0.00.  CRP less than 1.00, lipase 165. WBC 6.7, hemoglobin 14.9, 
hematocrit 44, platelet count 193.

 

CT abdomen and pelvis shows "no evidence for acute findings, status post 
cholecystectomy and there is intra and extrahepatic ductal distention, which is 
unchanged, complex cystic and solid lesion in the left kidney, recommend an 
outpatient multiphase contrast enhanced CT if they need for further evaluation. 
Bilateral nonobstructing renal calculi and status post hysterectomy."

 

EKG shows a sinus bradycardia with a heart rate about 50 and no evidence of 
ischemia.

 

ASSESSMENT:  Ms. Porter is a 68-year-old female with a past medical history of 
depression, suicidal ideation, likely irritable bowel syndrome, and 
pseudoseizures, who presents today to the hospital with concern for findings 
herself in the floor and possible syncopal episode.  Our plans are for 
observation in the hospital for the followin.  Syncope:  The patient does not remember the events leading up to findings 
herself from the floor.  It is possible that she had a syncopal episode, was 
concerning possibly this would be an arrhythmia.  She will be monitored on 
telemetry monitor overnight.  She has no evidence of infection.  No metabolic 
abnormalities.  Her vital signs are stable.  It is also possible that she could 
have a pseudoseizure or some other conversion type disorder given her complex 
psychiatric history.  She does endorse a recent history of worsening depressive 
symptoms.  She denies suicidal ideation, but states "I just wish sometimes I 
were not here."  I think she deserves evaluation by a psychiatric team for 
consideration of admission to mental health unit once her evaluation on the 
medical floor is complete.

2.  Hyperlipidemia:  Continue rosuvastatin.

3.  Diarrhea:  The patient reports a long term history of intermittent periods 
of constipation and diarrhea.  She states that her symptoms that she has now 
are consistent with previous.  Based on the symptomatology alone, it sounds 
like she may have irritable bowel syndrome.  I do note that she is following 
closely already [facility] and has 
plans for upper and lower endoscopy there in February.  I think no further 
workup is indicated at this time, but we will continue to evaluate and adjust 
that further if needed.

4.  DVT prophylaxis with heparin subcu.

5.  Code status is full code.

 

TIME SPENT:  Approximately 60 minutes was spent on the admission of this patient
, more than half of the time was spent with the patient at the bedside 
reviewing the events leading up to this hospitalization, performing the 
physical examination, and reviewing my plan of care.

 

 ____________________________________ MONIKA MEADOWS, NP

 

[id number]/[facility] #: [id number]

DONNA

## 2018-12-02 RX ADMIN — MIRTAZAPINE SCH MG: 15 TABLET, FILM COATED ORAL at 20:34

## 2018-12-02 RX ADMIN — TOPIRAMATE SCH MG: 25 TABLET, FILM COATED ORAL at 07:51

## 2018-12-02 RX ADMIN — HEPARIN SODIUM SCH UNITS: 5000 INJECTION INTRAVENOUS; SUBCUTANEOUS at 13:59

## 2018-12-02 RX ADMIN — MORPHINE SULFATE PRN MG: 4 INJECTION INTRAVENOUS at 13:59

## 2018-12-02 RX ADMIN — OMEPRAZOLE SCH MG: 20 CAPSULE, DELAYED RELEASE ORAL at 07:50

## 2018-12-02 RX ADMIN — ALPRAZOLAM SCH MG: 0.5 TABLET ORAL at 20:35

## 2018-12-02 RX ADMIN — TOPIRAMATE SCH MG: 25 TABLET, FILM COATED ORAL at 20:35

## 2018-12-02 RX ADMIN — CALCIUM POLYCARBOPHIL SCH MG: 625 TABLET, FILM COATED ORAL at 16:57

## 2018-12-02 RX ADMIN — HEPARIN SODIUM SCH UNITS: 5000 INJECTION INTRAVENOUS; SUBCUTANEOUS at 20:36

## 2018-12-02 RX ADMIN — ALPRAZOLAM SCH MG: 0.5 TABLET ORAL at 07:50

## 2018-12-02 RX ADMIN — HEPARIN SODIUM SCH UNITS: 5000 INJECTION INTRAVENOUS; SUBCUTANEOUS at 05:35

## 2018-12-02 RX ADMIN — MORPHINE SULFATE PRN MG: 4 INJECTION INTRAVENOUS at 20:36

## 2018-12-02 RX ADMIN — ASPIRIN SCH MG: 81 TABLET, CHEWABLE ORAL at 07:52

## 2018-12-02 NOTE — PN
Subjective


Date of Service: 12/02/18


Interval History: 





Pt seen and examined.  Meds and labs reviewed.  





CC: Depressed, but denies SI/HI





ROS:  Denied HA/dizziness, F/C, N/V, CP, SOB, increased cough, sputum production

, abd pain, diarrhea, constipation, dysuria, myalgias, arthralgias, throat pain

, and new skin lesions.  The rest of the 14 point ROS are unremarkable.





PHYSICAL EXAM:


GEN APPEARANCE: Awake, not in acute distress, Ox3


HEENT: NC/AT, PERRLA, moist oral mucosa, (-) throat erythema


NECK: Soft, supple, (-) cervical LAD, (-)JVD


HEART: S1S2 WNL, RRR, No MRG


CHEST: CTA, BL, GAE, No W/R/R


ABD: Soft, ND/NT, NABS 4x Q


EXT: No C/C/E


SKIN: Warm to touch


PSYCH: No active psychosis, hallucinations,  (+)Depression, (-)SI/HI





Objective


Active Medications: 








Acetaminophen (Tylenol Tab*)  650 mg PO Q6H PRN


   PRN Reason: PAIN


   Last Admin: 12/01/18 21:57 Dose:  650 mg


Alprazolam (Xanax Tab*)  0.5 mg PO BID [facility]


   Last Admin: 12/02/18 07:50 Dose:  0.5 mg


Aspirin (Aspirin 81 Mg Chew Tab*)  81 mg PO QAM [facility]


   Last Admin: 12/02/18 07:52 Dose:  81 mg


Atorvastatin Calcium (Lipitor*)  20 mg PO 1700 LYNN


Calcium Polycarbophil (Fibercon Tab*)  625 mg PO AC [facility]


Diphenoxylate HCl/Atropine (Lomotil Tab*)  1 tab PO QID PRN


   PRN Reason: DIARRHEA


Heparin Sodium (Porcine) (Heparin Vial(*))  5,000 units SUBCUT Q8HR [facility]


   Last Admin: 12/02/18 13:59 Dose:  5,000 units


Iodixanol (Visipaque* 320 (Contrast))  80 ml IV ONCE [facility]


   Stop: 12/04/18 10:50


   Last Admin: 12/02/18 12:11 Dose:  80 ml


Mirtazapine (Remeron Tab*)  7.5 mg PO BEDTIME [facility]


   Last Admin: 12/01/18 20:05 Dose:  7.5 mg


Morphine Sulfate (Morphine Vial*)  1 mg IV Q4H PRN


   PRN Reason: PAIN


   Last Admin: 12/02/18 13:59 Dose:  1 mg


Nicotine (Nicotine Inhaler*)  10 mg INH Q2H PRN


   PRN Reason: CRAVING


Omeprazole (Prilosec Cap*)  20 mg PO DAILY [facility]


   Last Admin: 12/02/18 07:50 Dose:  20 mg


Ondansetron HCl (Zofran Odt Tab*)  4 mg PO Q6H PRN


   PRN Reason: NAUSEA


Topiramate (Topamax(*))  50 mg PO BID [facility]


   Last Admin: 12/02/18 07:51 Dose:  50 mg








 Vital Signs - 8 hr











  12/02/18 12/02/18 12/02/18





  07:30 07:50 09:50


 


Temperature 98.4 F  


 


Pulse Rate 50  


 


Respiratory 16 16 18





Rate   


 


Blood Pressure 103/55  





(mmHg)   


 


O2 Sat by Pulse 100  





Oximetry   














  12/02/18 12/02/18 12/02/18





  11:34 13:59 14:47


 


Temperature 97.7 F  


 


Pulse Rate 60  73


 


Respiratory 16 16 





Rate   


 


Blood Pressure 91/47  96/55





(mmHg)   


 


O2 Sat by Pulse 94  





Oximetry   











Oxygen Devices in Use Now: Nasal Cannula


Result Diagrams: 


 12/01/18 14:38





 12/01/18 14:38





Assess/Plan/Problems-Billing


Assessment: 











- Patient Problems


(1) Syncope


Current Visit: Yes   Status: Acute   Code(s): R55 - SYNCOPE AND COLLAPSE   

SNOMED Code(s): 654223870


   Comment: 


-Ordered CTA of H&N, w/c did not show any significant RYAN nor aneurysm, however

, it does show cerebellar atrophy w/c may contribute to falls but not 

necessarily LOChowever, pt is a very poor historian to provide details of 

purported syncope


-R/O for VTE w/D-dimer WNL


-Continue to trend troponins


-Reviewed o/n telemetry and only showed asymptomatic sinus bradycardia while 

asleep


-Denies any form of urinary symptoms and absence of leukocytosis and fever 

suggests asymptomatic bacteriuria and not UTI w/U/A data


-Serum prolactin ordered and was found to be normal


-Orthostatics ordered and although a mild drop in BP observed from supine to 

standing position, it did not meet clinical criteria for orthostasis


-EEG pending given pts previous hx of siezures


-Will await psych eval   





(2) Depressive disorder


Current Visit: No   Status: Chronic   Priority: High   Code(s): F32.9 - MAJOR 

DEPRESSIVE DISORDER, SINGLE EPISODE, UNSPECIFIED   SNOMED Code(s): 17455822


   Comment: 


-Please see above discussion


-Continue Mirtazapine---currently at lowest dose 7.5 mg qHS


-Unclear whether psych issues contributing or primary factor for presentation 

presumed to be syncope   





(3) Hyperlipidemia


Current Visit: Yes   Status: Acute   Code(s): E78.5 - HYPERLIPIDEMIA, 

UNSPECIFIED   SNOMED Code(s): 33562876


   Comment: 


-Rosuvastatin non-formulary, thus, ordered co-equivalent dose of Atorvastatin   





(4) Diarrhea


Current Visit: Yes   Status: Acute   Code(s): R19.7 - DIARRHEA, UNSPECIFIED   

SNOMED Code(s): 24746506


   Comment: 


-Reports long-term hx of intermittent constipation and diarrhea---suspicious 

for IBS


-Will place on Fibercon tabs qAC


-Will continue watchful waiting   





(5) Seizure disorder


Current Visit: No   Status: Acute   Code(s): G40.909 - EPILEPSY, UNSP, NOT 

INTRACTABLE, WITHOUT STATUS EPILEPTICUS   SNOMED Code(s): 069244602


   Comment: 


-Continue Topiramate   





(6) DVT prophylaxis


Current Visit: Yes   Status: Acute   Code(s): UOC1817 -    SNOMED Code(s): 

093154069


   Comment: 


-Continue Heparin SQq8H   


Status and Disposition: 





-For PT eval


-For possible D/C in AM once cleared by psych and evaluated by PT


-Awaiting EEG

## 2018-12-03 VITALS — SYSTOLIC BLOOD PRESSURE: 106 MMHG | DIASTOLIC BLOOD PRESSURE: 59 MMHG

## 2018-12-03 LAB
BASOPHILS # BLD AUTO: 0.1 10^3/UL (ref 0–0.2)
EOSINOPHIL # BLD AUTO: 0.2 10^3/UL (ref 0–0.6)
HCT VFR BLD AUTO: 38 % (ref 35–47)
HGB BLD-MCNC: 12.6 G/DL (ref 12–16)
LYMPHOCYTES # BLD AUTO: 3.3 10^3/UL (ref 1–4.8)
MCH RBC QN AUTO: 32 PG (ref 27–31)
MCHC RBC AUTO-ENTMCNC: 33 G/DL (ref 31–36)
MCV RBC AUTO: 97 FL (ref 80–97)
MONOCYTES # BLD AUTO: 0.5 10^3/UL (ref 0–0.8)
NEUTROPHILS # BLD AUTO: 3.1 10^3/UL (ref 1.5–7.7)
NRBC # BLD AUTO: 0 10^3/UL
NRBC BLD QL AUTO: 0.1
PLATELET # BLD AUTO: 170 10^3/UL (ref 150–450)
RBC # BLD AUTO: 3.91 10^6/UL (ref 4–5.4)
WBC # BLD AUTO: 7.2 10^3/UL (ref 3.5–10.8)

## 2018-12-03 RX ADMIN — TOPIRAMATE SCH MG: 25 TABLET, FILM COATED ORAL at 07:38

## 2018-12-03 RX ADMIN — OMEPRAZOLE SCH MG: 20 CAPSULE, DELAYED RELEASE ORAL at 07:40

## 2018-12-03 RX ADMIN — CALCIUM POLYCARBOPHIL SCH MG: 625 TABLET, FILM COATED ORAL at 07:37

## 2018-12-03 RX ADMIN — ASPIRIN SCH MG: 81 TABLET, CHEWABLE ORAL at 07:40

## 2018-12-03 RX ADMIN — MORPHINE SULFATE PRN MG: 4 INJECTION INTRAVENOUS at 07:45

## 2018-12-03 RX ADMIN — HEPARIN SODIUM SCH UNITS: 5000 INJECTION INTRAVENOUS; SUBCUTANEOUS at 05:28

## 2018-12-03 RX ADMIN — HEPARIN SODIUM SCH: 5000 INJECTION INTRAVENOUS; SUBCUTANEOUS at 16:27

## 2018-12-03 RX ADMIN — ACETAMINOPHEN PRN MG: 325 TABLET ORAL at 11:54

## 2018-12-03 RX ADMIN — MORPHINE SULFATE PRN MG: 4 INJECTION INTRAVENOUS at 11:55

## 2018-12-03 RX ADMIN — ALPRAZOLAM SCH MG: 0.5 TABLET ORAL at 07:37

## 2018-12-03 RX ADMIN — MORPHINE SULFATE PRN MG: 4 INJECTION INTRAVENOUS at 02:37

## 2018-12-03 RX ADMIN — CALCIUM POLYCARBOPHIL SCH MG: 625 TABLET, FILM COATED ORAL at 11:54

## 2018-12-03 NOTE — CONS
CONSULTATION REPORT:

 

DATE OF CONSULT:  18

 

ATTENDING CLINICIAN:  Pam Cosby NP

 

CONSULTING PHYSICIAN:  Dr. Isra Woodson.

 

REASON FOR CONSULT:  Depression.

 

SUBJECTIVE HISTORY:  Psychiatry is asked to see this 68-year-old  white 
female with a history of chronic depression, who is currently hospitalized on 
the telemetry unit following a fall in her home due to concerns of the primary 
team that perhaps the patient's depression was somehow related to this episode.
  I spoke with her attending, Pam Cosby who indicated that the patient 
had syncopal episode but thus far her workup has been largely negative and they 
are close to discharging her pending psychiatric clearance.  At no time she 
made a suicidal statement according to the primary team.  When I met with the 
patient, she is cooperative, although slightly irritable.  She indicates that 
she goes to [facility] once a week where she sees 
Gaby Dawkins as a therapist and Dr. Rody Calzada as her psychiatrist.  She 
indicates that the week leading up to this hospitalization, she has been having 
daily diarrhea and vomiting.  She started to have abdominal pain.  She failed 
to see how her depression might be related, stating that she suffered with 
depression for the past 60 years. Apparently, the patient lives alone in her 
own house here in [place], [place] and has limited psychosocial support being 
largely estranged from her family having no children and having very little in 
the way of social activities.  I screened her for major depression and she 
admitted to insomnia, anhedonia, poor energy and lack of appetite; however, she 
denied guilts, concentration problems, or psychomotor retardation.  
Interestingly, when I asked her about suicidal ideations, she responded by 
saying, "I would not mind dying at all.  I really do not see the point of going 
on living."  When I asked her about formal plan or intention to die, she denies 
active suicidality, but indicates more or less that she has had suicidal 
ideations since she was in her 20s.

 

PAST PSYCHIATRIC HISTORY:  The patient is currently taking mirtazapine 7.5 mg 
daily, Pristiq 100 mg daily, Xanax 0.5 mg p.o. b.i.d.  She has had several 
suicidal overdoses in her life with the last being in .  She has had 
several hospitalizations here at [facility] with the last being in 
2016 under the service of Dr. Jordy Landry.  She has been on numerous 
antidepressants as well as adjunctive therapies with such medications as Abilify
, Seroquel, and Latuda. She did go through to ECT as an inpatient 8 to 9 years 
ago at [facility] during an inpatient hospitalization there.  
She states all of her prior antidepressant therapies including ECT have been of 
limited benefit.

 

SUBSTANCE ABUSE HISTORY:  Negative for alcohol or illicit drugs.  She does 
smoke one-half pack of cigarettes per day.

 

PAST MEDICAL HISTORY:  Significant for chronic abdominal pain, thought to be 
secondary to irritable bowel syndrome.  She has migraines, cholecystitis, 
history of hysterectomy, history of non-epileptic seizures, history of stroke 
in 2017.

 

CURRENT MEDICATIONS:  Include,

1.  Pantoprazole 40 mg daily.

2.  Pristiq 100 mg daily.

3.  Aspirin 81 mg daily.

4.  Xanax 0.5 mg twice daily.

5.  Topamax 50 mg twice daily.

6.  Rosuvastatin 10 mg p.o. q. p.m.

7.  Ondansetron 4 mg every 4 hours as needed for nausea.

8.  Remeron 7.5 mg p.o. q.h.s.

9.  Lomotil one tablet p.o. 4 times daily.

 

ALLERGIES:  She is allergic to CODEINE and ERYTHROMYCIN.

 

FAMILY HISTORY:  She has 1 sister with depression who is now .  There 
are no suicides known at the family.

 

SOCIAL HISTORY:  The patient was born and raised in [place], [place] 
to an intact family where she was the 2nd of 6 total children.  The patient 
insists that she was treated differently from all of her siblings, being forced 
into a caregiving role where she assisted in the upbringing of her younger 
siblings. Apparently, her father  20 years ago, but her mother is still 
alive, living in [place], [place].  The patient is a high school graduate 
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 
was forced to go to the [employer] [facility] for close to 4 years 
for treatment of refractory depression.  After being released from the hospital
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 
22 years at [employer] where she was fired in  for unspecified reasons.  
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 
adults to and from appointments.  She is single.  She  after her 1 
marriage and had no children.  She is fairly socially isolated without much in 
the way of friends or family locally and she states that she prefers being 
alone.  The patient describes herself as somewhat spiritual, but does not 
attend [employer].  She has no history of legal problems.

 

MENTAL STATUS EXAM:  The patient is an aging white female with graying hair, 
who is calm, cooperative, slightly irritable initially.  She is a good 
historian and her speech has a normal rate, tone, and volume.  Mood appears to 
be depressed with constricted tearful affect.  Thought process is linear, goal 
directed.  Thought content is significant for her concerns of her somatic 
complaints.  She is endorsing some vague passive suicidality, but denies active 
suicidal thinking or intentions.  She denies homicidality.  She denies auditory 
or visual hallucinations.  Insight and judgment are fair given her willingness 
to follow with outpatient treatment in the community.  Cognitively, she is 
awake and alert with what would appear to be an average intellect.

 

DIAGNOSES:  As follows:

Axis I:  Major depressive disorder, recurrent, moderate. 

Axis II:  Deferred.

 

ASSESSMENT:  The patient is a 68-year-old  white female with a history 
of recurrent depression, who was admitted to the telemetry unit following a 
syncopal episode and the primary team is wondering whether this is perhaps 
related to undertreated depression.  At this time, the patient remains 
depressed and has multiple neurovegetative symptoms of depression; however, she 
is actively treated on the outpatient basis by Dr. Calzada and her therapist at 
[facility].  The patient is not active threat to 
herself or others and I feel that she is psychiatrically cleared to be 
discharged to follow up with outpatient resources.

 

RECOMMENDATIONS TO PRIMARY TEAM:  Psychiatry is recommending that the patient 
to be discharged.  She has a follow-up appointment with her therapist, Gaby Dawkins on Wednesday, 18 at 2 o'clock p.m.  We do not recommend any 
changes in her medications at this time, but she can follow up with Dr. Rody Calzada as needed on an outpatient basis.  Psychiatry is signing off.

 

Thank you for the consult.

 

 [id number]/[facility] #: [id number]

DONNA

## 2018-12-03 NOTE — ECHO
Patient:      DORYS LARIOS  

Med Rec#:     [id number]            :          1950          

Date:         2018            Age:          68y                 

Account#:     [id number]          Height:       157 cm / 61.8 in

Accession#:   [id number]           Weight:       64 kg / 141.1 lbs

Sex:          F                     BSA:          1.64

Room#:        [facility]                

Admit Date#:  2018          

Type:         Inpatient

 

Referring:    Marcelino Blair

Reading:      Hair Hobbs MD

Sonographer:  Elizabet Samuels RN RDCS

CC:           Max Syed MD

______________________________________________________________________

 

Transthoracic Echocardiogram

 

Indication:

Syncope

BP:           110/57

HR:           58

Rhythm:       Bradycardia

 

Findings     

History:

Migraines, CVA, seizure disorder 

 

Technical Comments:

The study quality is fair.  

 

Left Ventricle:

The left ventricular chamber size is normal. There is no left

ventricular hypertrophy.   There is increased basal septal hypertrophy

noted without evidence of an increased gradient across the left

ventricular outflow tract.  Global left ventricular wall motion and

contractility are within normal limits. Left ventricular systolic

function is at the lower limits of normal.  The estimated ejection

fraction is 50-55%.  The assessment of diastolic function is

non-diagnostic. 

 

Left Atrium:

The left atrial chamber size is normal. 

 

Right Ventricle:

The right ventricular chamber size and systolic function are within

normal limits. 

 

Right Atrium:

The right atrial cavity size is normal. A bubble study was negative on a

prior transthoracic echo and was not repeated today per Dr. Blair. 

 

Aortic Valve:

The aortic valve leaflets are mildly thickened. There is aortic annular

calcification. There is trace to mild aortic regurgitation. There is no

evidence of aortic stenosis. 

 

Mitral Valve:

The mitral valve leaflets are mildly thickened. There is mild mitral

regurgitation.  There is no evidence of mitral stenosis. 

 

Tricuspid Valve:

The tricuspid valve leaflets are normal.  There is mild tricuspid

regurgitation. No pulmonary hypertension is noted. There is no tricuspid

stenosis. 

 

Pulmonic Valve:

The pulmonic valve structure is not well visualized. There is no

evidence of pulmonic regurgitation. There is no pulmonic stenosis.  

 

Pericardium:

There is no significant pericardial effusion. 

 

Aorta:

There is no dilatation of the ascending aorta. The aortic arch is not

well visualized.  There is no dilation of the aortic root. 

 

Pulmonary Artery:

The main pulmonary artery is not well visualized. 

 

Venous:

The venous system is not well visualized. The inferior vena cava is not

visualized. 

 

Summary:

There are no significant changes when compared to the previous study

done on 16 

 

Conclusions

There is increased basal septal hypertrophy noted without evidence of an

increased gradient across the left ventricular outflow tract. 

Global left ventricular wall motion and contractility are within normal

limits.

Left ventricular systolic function is at the lower limits of normal. 

The estimated ejection fraction is 50-55%. 

The right atrial cavity size is normal. A bubble study was negative on a

prior transthoracic echo and was not repeated today per Dr. Blair.

There is trace to mild aortic regurgitation.

There is mild mitral regurgitation. 

There is mild tricuspid regurgitation.

No pulmonary hypertension is noted.

There is no significant pericardial effusion.

 

Measurements     

Name                    Value         Normal Range            

RVIDd (AP) 2D           2.4 cm        (0.9 - 2.6)             

RVDdMajor (2D)          2.7 cm        (2.2 - 4.4)             

RAd ISD 4CH             4 cm          (3.4 - 4.9)             

RA (A4C)W               2.8 cm        (2.9 - 4.6)             

IVSd (2D)               0.7 cm        (0.6 - 1)               

LVPWd (2D)              0.7 cm        (0.6 - 1)               

LVIDd (2D)              4.6 cm        (3.6 - 5.4)             

LVIDs (2D)              3.2 cm        -                        

LV FS (2D)              30 %          (25 - 45)               

Aortic Annulus          1.9 cm        (1.4 - 2.6)             

Ao root diameter (2D)   3 cm          (2.1 - 3.5)             

Ascending Ao            3.3 cm        (2.1 - 3.4)             

LA dimension (AP) 2D    3.1 cm        (2.3 - 3.8)             

LAd ISD 4CH             3.5 cm        (2.9 - 5.3)             

LA ISD 4CH W            3.1 cm        (2.5 - 4.5)             

 

Name                    Value         Normal Range            

LA ESV BP (A/L) index   18 ml/m2      -                        

 

Name                    Value         Normal Range            

MV E-wave Vmax          0.77 m/sec    -                        

MV deceleration time    335 msec      -                        

MV A-wave Vmax          0.95 m/sec    -                        

MV E:A ratio            0.8 ratio     -                        

LV septal e' Vmax       0.05 m/sec    -                        

LV lateral e' Vmax      0.06 m/sec    -                        

LV E:e' septal ratio    15.4 ratio    -                        

LV E:e' lateral ratio   12.8 ratio    -                        

 

Name                    Value         Normal Range            

AV Vmax                 1.5 m/sec     -                        

AV VTI                  31.7 cm       -                        

AV peak gradient        9 mmHg        -                        

AV mean gradient        6 mmHg        -                        

LVOT Vmax               1.2 m/sec     -                        

LVOT VTI                21.7 cm       -                        

LVOT peak gradient      6 mmHg        -                        

LVOT mean gradient      3 mmHg        -                        

 

Name                    Value         Normal Range            

TR Vmax                 2.3 m/sec     -                        

TR peak gradient        21 mmHg       -                        

RAP                     8 mmHg        -                        

RVSP                    29 mmHg       -                        

 

Name                    Value         Normal Range            

PV Vmax                 0.72 m/sec    -                        

 

Electronically signed by: Hair Hobbs MD on 2018 16:20:50

## 2018-12-04 NOTE — DS
CC:  Dr. Syed.*

 

DISCHARGE SUMMARY:  12/02/18

 

DATE OF DISCHARGE:  12/03/18

 

PRIMARY CARE PROVIDER:  Dr. Syed.

 

ATTENDING PHYSICIAN:  Dr. Burger * (dictated by Apple Martínez NP)

 

PRIMARY DIAGNOSIS:  Syncope.

 

SECONDARY DIAGNOSES:

1.  Hyperlipidemia.

2.  Diarrhea/abdominal pain.

3.  Depression.

 

CONSULTATIONS WHILE IN THE HOSPITAL:  Dr. Woodson.

 

PROCEDURES WHILE IN THE HOSPITAL:  No procedures.

 

STUDIES WHILE IN THE HOSPITAL:  



Head CTA:  No evidence for significant carotid stenosis.  No evidence of 
aneurysm.  Cerebral atrophy unchanged.

 

Abdominal CT:  No evidence of the acute findings, status post cholecystectomy, 
and there is intra and extra hepatic ductal distention which is unchanged, 
complex cyst and solid lesions of left kidney, recommend an outpatient 
multiphase contrast- enhanced CT if they need to be further evaluated, 
bilateral not obstructing renal calculi, and status post hysterectomy.

 

Echocardiogram:  Increased basal septal hypertrophy noted without evidence of 
increased gradient across the left ventricle.  EF 50% to 55%.  Trace to mild 
aortic regurgitation.  Mild mitral regurgitation.  Mild tricuspid regurgitation.

 

DISCHARGE HOME MEDICATIONS: 



New Home Medications:  



No new home medications.

 

Continued Home Medications:

1.  Protonix 40 mg p.o. daily.

2.  Pristiq 50 mg p.o. q.p.m.

3.  Aspirin 81 mg p.o. q.a.m.

4.  Xanax 0.5 mg p.o. b.i.d.

5.  Topamax 50 mg p.o. b.i.d.

6.  Crestor 10 mg p.o. q.p.m.

7.  Zofran 4 mg ODT tab q.6 hours p.r.n. nausea.

8.  Remeron 7.5 mg p.o. at bedtime.

9.  Lomotil 1 tab p.o. q.i.d. p.r.n.

10.  Maalox 30 mL p.o. q.6 hours p.r.n.

 

Discontinued Home Medications:  



No home medications were discontinued.

 

HISTORY OF PRESENT ILLNESS/HOSPITAL COURSE:  Ms. Porter is a 68-year-old female 
with a past medical history of depression, suicidal ideation, chronic abdominal 
pain, constipation, diarrhea, migraine, cholecystitis, non-epileptic seizures, 
who presented to the ED on 12/01/18 with complaint of syncope.  Please see 
history and physical dictated by Jennifer Pritchard for complete summary of the 
events leading up to hospitalization, but in short the patient recalled going 
to fix herself something to eat and the next thing she knew she woke on the 
floor.  The patient was brought to the emergency room for further evaluation.  
In the emergency her labs were unremarkable and abdominal CT showed no acute 
abnormalities.  She was admitted for observation to further evaluate the 
syncopal episode.

 

During this hospital stay, patient remained on telemetry floor.  She was in 
sinus rhythm with an average rate of 75 beats per minute.  The patient remained 
free from any syncopal/presyncopal episodes during her admission.  She also was 
free from seizure activity.  Her labs remain stable.  She underwent echo as 
mentioned above, the patient also underwent EEG and we are awaiting results.  
The patient was also seen by Dr. Woodson due to her depression.  Dr. Woodson agreed 
that patient is safe for discharge and needed no adjustment to psych 
medications as she has a followup with her outpatient therapist on Wednesday.  



Today, Ms. Porter is stable for discharge and is requesting discharge.

 

Her vital signs are as follows:  Temp 98.9, heart rate 64, respiratory rate 16, 
O2 saturation 96% on room air.  /59.

 

Labs:  Sodium 139, potassium 3.7, chloride 107, carbon dioxide 26, BUN 12, 
creatinine 0.87, magnesium 1.8.  WBC 7.2, hemoglobin 12.6, hematocrit 38, 
platelet 170.

 

REVIEW OF SYSTEMS:  The patient complains of upper abdominal pain, which is 
relieved by belching or passing gas.  The patient denies dizziness, 
lightheadedness, visual changes, tinnitus, chest pain, shortness of breath, 
nausea, vomiting, diarrhea, and palpitations.  A 12-point review of system is 
completed and all others negative.

 

PHYSICAL EXAM: 



General:  Ms. Porter is sitting in bed.  She is in no acute distress.  She is 
cheerful because she wants to be discharged.

 

HEENT:  Head nontraumatic, normocephalic.  EOMs intact.  Sclerae normal.  Oral 
mucosa moist.

 

Neck:  Supple.  No cervical or supraclavicular adenopathy.

 

Heart:  S1, S2.  No murmur, rubs or gallops.

 

Abdomen:  Soft, mildly tender to palpation diffusely.  Bowel sounds positive x4.

 

Extremities:  No cyanosis or edema.

 

Skin:  Intact.

 

Neuro:  Alert, oriented x3, full ROM.  No focal deficits or weakness.

 

Psych:  The patient reports mild depression which is her baseline.  Denies SI. 
Denies HI.  Denies thoughts of self-injurious behavior.

 

DISCHARGE PLAN/FOLLOWUP:

1.  Syncope:  The patient does not drive.  The patient is educated about 
continuing not to drive or swim, bathe unattended, operate heavy machinery, 
climb ladder, or work on roof.  The patient was free from syncopal/presyncopal/
seizure activity while admitted.  The patient has scheduled followup with Dr. Estevez who she was established with on 12/14/18 at 3 p.m.  The patient was 
encouraged to keep this appointment and EEG can be reviewed at this time.

2.  Depressive disorder:  The patient is to continue medications as same.  The 
patient is safe for discharge as she is free from suicidal ideation.  The 
patient has close followup with her outpatient therapist.

3.  Abdominal pain:  The patient reports that the abdominal pain she is 
experiencing today is the same the abdominal pain that she experienced 
intermittently for 20 years in addition to intermittent diarrhea and 
constipation. The patient has a scheduled followup with Dr. Forbes in 
February.  We have scheduled the patient a closer followup in January with Dr. Forbes to evaluate symptoms.

4.  Education:  The patient was educated on new/worsening symptoms and when to 
call 911, return to ED, go to primary care provider.  The patient states 
understanding.

5.  Magnesium.  Educated the patient on low magnesium of 1.8.  Encouraged the 
patient to take mag oxide 400 mg p.o. daily and follow up with primary care to 
have labs redrawn.

 

I reviewed this plan with my attending Dr. Burger who agrees with the plan.

 

This is a summarized report of a complex medical history and hospital stay.  
For further details please see the entire medical record.

 

TIME SPENT:  Approximately 45 minutes were spent on this admission, greater 
than half of that spent face to face to the patient discussing discharge plans 
and instructions.

 

____________________________________ APPLE MARTÍNEZ, FAITH

 

[id number] #: [id number]

DONNA

## 2018-12-04 NOTE — EEG
ELECTROENCEPHALOGRAPHY:

 

DATE OF STUDY:  12/03/18

 

LOCATION:  She is in room 441.

 

REFERRING PHYSICIAN:  Dr. Blair.

 

CLINICAL HISTORY:  Episode of the patient finding herself on the floor and 
unaware what had happened for 45 minutes in duration.  This was on 12/01/18.

 

MEDICATIONS:  Include:

1.  Morphine.

2.  Remeron.

3.  Lipitor.

4.  Topamax.

5.  Prilosec.

6.  Alprazolam.

 

REPORT:  This 16-channel EEG is remarkable for background rhythms consisting of 
alpha rhythm in the occipital derivations at about 8 cycles per second. 
Moderate voltage beta rhythms are seen bifrontally and centrally. Occasionally, 
rhythmic bitemporal activities noted.  Activation procedures were not 
attempted.  The patient drowses with central and bitemporal slowing, but does 
not fall asleep. There are no clinical events.  There are no focal, lateralized
, or epileptiform abnormalities.

 

CLINICAL IMPRESSION:  Normal awake and drowsy EEG.

 

[id number] #: [id number]

[facility]

## 2018-12-12 ENCOUNTER — HOSPITAL ENCOUNTER (OUTPATIENT)
Dept: HOSPITAL 25 - OR | Age: 68
Discharge: HOME | End: 2018-12-12
Attending: INTERNAL MEDICINE
Payer: MEDICARE

## 2018-12-12 VITALS — SYSTOLIC BLOOD PRESSURE: 105 MMHG | DIASTOLIC BLOOD PRESSURE: 71 MMHG

## 2018-12-12 DIAGNOSIS — F32.9: ICD-10-CM

## 2018-12-12 DIAGNOSIS — K44.9: ICD-10-CM

## 2018-12-12 DIAGNOSIS — R19.7: ICD-10-CM

## 2018-12-12 DIAGNOSIS — K59.00: Primary | ICD-10-CM

## 2018-12-12 DIAGNOSIS — Z86.010: ICD-10-CM

## 2018-12-12 PROCEDURE — 87077 CULTURE AEROBIC IDENTIFY: CPT

## 2018-12-12 PROCEDURE — 88305 TISSUE EXAM BY PATHOLOGIST: CPT

## 2018-12-15 NOTE — PRO
CC:  Max Syed MD *

 

DATE OF PROCEDURE:  12/12/18 [facility]

 

REFERRING PROVIDER:  Max Syed MD

 

PROCEDURE:  EGD with biopsy.

 

INDICATIONS:  Chronic GERD and diarrhea.

 

MEDICATIONS:  Given by Anesthesia.

 

DESCRIPTION OF PROCEDURE:  Full disclosure of risks were reviewed with the 
patient as detailed on the consent form.  The patient was placed in the left 
lateral decubitus position and monitored with continuous pulse oximetry, 
interval blood pressure monitoring, and direct observation.  A bite block was 
placed between the teeth and Olympus gastroscope was then inserted into the 
patient's mouth and advanced down the esophagus, into the stomach and into the 
distal duodenum. Findings are detailed below.

 

FINDINGS:  Esophagus was a normal tubular structure without rings or 
strictures.  There is an irregular Z-line occurring at 37 cm with the proximal 
tongue extending to 36 cm.  Biopsies were obtained.  There was a hiatal hernia 
with the top of the gastric fold measuring 40 cm.  Scope was then advanced into 
the stomach.  The stomach was examined in forward and retroflex views.  There 
was mild antral erythema.  Biopsy obtained and sent for CLOtest.  Scope was 
then advanced into the duodenum.  Duodenum was normal in appearance without 
erosions or ulcers.  Scope was then withdrawn from the patient.  The patient 
tolerated the procedure well and was recovered in the GI recovery area.

 

IMPRESSION:

1.  Irregular Z-line biopsied.

2.  Hiatal hernia.

3.  Mild gastric antral erythema.

 

FOLLOWUP:

1.  Await pathology. 

2.  Continue PPI.

3.  Proceed with colonoscopy.  See separately dictated procedure report.

 

Thank you very much for this kind referral.

 

 [id number] #: [id number]

[facility]

## 2018-12-15 NOTE — PRO
CC:  Max Syed MD *

 

DATE OF PROCEDURE:  12/12/18 [facility]

 

PROCEDURE PERFORMED:  Colonoscopy with biopsy, complete.

 

REFERRING PROVIDER:  Max Syed MD

 

INDICATIONS:  Colon cancer screening and chronic diarrhea.

 

MEDICATIONS:  ______

 

DESCRIPTION OF PROCEDURE:  Full disclosure of risks were reviewed with the 
patient as detailed on the consent form.  The patient was placed in the left 
lateral decubitus position and monitored with continuous pulse oximetry, 
capnography, interval blood pressure monitoring, and direct observation.  After 
anorectal examination was performed, the pediatric colonoscope was advanced to 
the sigmoid colon descending junction.  The distal left colon was very tight 
and tortuous. Pediatric colonoscope was unable to get past this area.  The 
scope was removed. Gastroscope was then inserted into the rectum and advanced 
again to the level of the sigmoid and descending colon junction.  This was a 
very tight angle and the scope was unable to advance forward.  The patient then 
developed vomiting under sedation.  At this point, the decision was made to 
abort the procedure as the concern was that the risks would outweigh the 
benefits of proceeding further. Biopsies obtained as the scope was slowly 
withdrawn to rule out microscopic colitis.  No inflammation or polyps noted on 
withdrawal.  The patient recovered in the GI recovery area.

 

IMPRESSION:

1.  Incomplete colonoscopy to descending colon.  Very difficult colonoscopy.  
The patient had a failed colonoscopy as well in 2009 by Dr. Wilson.

2.  Normal colonic mucosa within this limited exam. 



FOLLOWUP:

1.  Await pathology.

2.  We will discuss with the patient the possible referral to either [facility] or 
[facility] for a second attempt at colonoscopy.

 

Thank you very much for this kind referral.

 

 [id number]/CPS #: [id number]

DONNA

## 2020-02-24 ENCOUNTER — HOSPITAL ENCOUNTER (EMERGENCY)
Dept: HOSPITAL 25 - ED | Age: 70
Discharge: HOME | End: 2020-02-24
Payer: MEDICARE

## 2020-02-24 VITALS — DIASTOLIC BLOOD PRESSURE: 78 MMHG | SYSTOLIC BLOOD PRESSURE: 138 MMHG

## 2020-02-24 DIAGNOSIS — Z90.49: ICD-10-CM

## 2020-02-24 DIAGNOSIS — Z90.710: ICD-10-CM

## 2020-02-24 DIAGNOSIS — Z79.82: ICD-10-CM

## 2020-02-24 DIAGNOSIS — Z79.899: ICD-10-CM

## 2020-02-24 DIAGNOSIS — K21.9: ICD-10-CM

## 2020-02-24 DIAGNOSIS — F17.210: ICD-10-CM

## 2020-02-24 DIAGNOSIS — Z88.1: ICD-10-CM

## 2020-02-24 DIAGNOSIS — Z88.5: ICD-10-CM

## 2020-02-24 DIAGNOSIS — F41.9: ICD-10-CM

## 2020-02-24 DIAGNOSIS — R11.2: Primary | ICD-10-CM

## 2020-02-24 DIAGNOSIS — F32.9: ICD-10-CM

## 2020-02-24 DIAGNOSIS — R19.7: ICD-10-CM

## 2020-02-24 LAB
ALBUMIN SERPL BCG-MCNC: 4.2 G/DL (ref 3.2–5.2)
ALBUMIN/GLOB SERPL: 1.9 {RATIO} (ref 1–3)
ALP SERPL-CCNC: 111 U/L (ref 34–104)
ALT SERPL W P-5'-P-CCNC: 5 U/L (ref 7–52)
ANION GAP SERPL CALC-SCNC: 7 MMOL/L (ref 2–11)
AST SERPL-CCNC: 13 U/L (ref 13–39)
BASOPHILS # BLD AUTO: 0.1 10^3/UL (ref 0–0.2)
BUN SERPL-MCNC: 14 MG/DL (ref 6–24)
BUN/CREAT SERPL: 17.3 (ref 8–20)
CALCIUM SERPL-MCNC: 8.8 MG/DL (ref 8.6–10.3)
CHLORIDE SERPL-SCNC: 111 MMOL/L (ref 101–111)
EOSINOPHIL # BLD AUTO: 0.1 10^3/UL (ref 0–0.6)
GLOBULIN SER CALC-MCNC: 2.2 G/DL (ref 2–4)
GLUCOSE SERPL-MCNC: 94 MG/DL (ref 70–100)
HCO3 SERPL-SCNC: 22 MMOL/L (ref 22–32)
HCT VFR BLD AUTO: 40 % (ref 35–47)
HGB BLD-MCNC: 13.7 G/DL (ref 12–16)
LYMPHOCYTES # BLD AUTO: 2.1 10^3/UL (ref 1–4.8)
MAGNESIUM SERPL-MCNC: 2 MG/DL (ref 1.9–2.7)
MCH RBC QN AUTO: 33 PG (ref 27–31)
MCHC RBC AUTO-ENTMCNC: 34 G/DL (ref 31–36)
MCV RBC AUTO: 96 FL (ref 80–97)
MONOCYTES # BLD AUTO: 0.5 10^3/UL (ref 0–0.8)
NEUTROPHILS # BLD AUTO: 3.7 10^3/UL (ref 1.5–7.7)
NRBC # BLD AUTO: 0 10^3/UL
NRBC BLD QL AUTO: 0.1
PLATELET # BLD AUTO: 243 10^3/UL (ref 150–450)
POTASSIUM SERPL-SCNC: 3.6 MMOL/L (ref 3.5–5)
PROT SERPL-MCNC: 6.4 G/DL (ref 6.4–8.9)
RBC # BLD AUTO: 4.18 10^6 /UL (ref 3.7–4.87)
SODIUM SERPL-SCNC: 140 MMOL/L (ref 135–145)
WBC # BLD AUTO: 6.4 10^3/UL (ref 3.5–10.8)

## 2020-02-24 PROCEDURE — 36415 COLL VENOUS BLD VENIPUNCTURE: CPT

## 2020-02-24 PROCEDURE — 83690 ASSAY OF LIPASE: CPT

## 2020-02-24 PROCEDURE — 85025 COMPLETE CBC W/AUTO DIFF WBC: CPT

## 2020-02-24 PROCEDURE — 96374 THER/PROPH/DIAG INJ IV PUSH: CPT

## 2020-02-24 PROCEDURE — 74177 CT ABD & PELVIS W/CONTRAST: CPT

## 2020-02-24 PROCEDURE — 96361 HYDRATE IV INFUSION ADD-ON: CPT

## 2020-02-24 PROCEDURE — 99282 EMERGENCY DEPT VISIT SF MDM: CPT

## 2020-02-24 PROCEDURE — 80053 COMPREHEN METABOLIC PANEL: CPT

## 2020-02-24 PROCEDURE — 96375 TX/PRO/DX INJ NEW DRUG ADDON: CPT

## 2020-02-24 PROCEDURE — 83735 ASSAY OF MAGNESIUM: CPT

## 2020-02-24 NOTE — XMS REPORT
Transition of Care

 Created on:2020



Patient:DORYS LARIOS

Sex:Female

:1950

External Reference #:[id number]





Demographics







 Address  [address] [place] [address]

 

 Home Phone  [phone]

 

 Preferred Language  Unknown

 

 Marital Status  Not  or 

 

 Amish Affiliation  Unknown

 

 Race  White

 

 Additional Race(s)  Unknown

 

 Ethnic Group  Not  or 









Author







 Organization  [facility]









Support







 Name  Relationship  Address  Phone

 

 Unavailable  Unavailable  Unavailable  Unavailable









Care Team Providers







 Name  Role  Phone

 

 MOIRA DAVEY  Primary Care Physician  Unavailable









Allergies, Adverse Reactions, Alerts







 Allergy  Code  CodeSystem  Reaction  Severity  Criticality  Status  Start



 Substance              Date



               



               

 

         Moderate      



               



               







Medications







 Medication  Medication  Medication  Start  Stop  Route  Dose  Status  Fill



   Code  CodeSystem  Date  Date        Instructions



                 



                 



                 

 

 alprazolam  222702  RxNorm  2019-  oral  0.5 mg 1  active  Take 1 
tablet



         10-19    tablet     three times



             three    a day as



             times a    needed for 30



             day    day(s)



                 



                 

 

 desvenlafaxine  [id number]  RxNorm  2019-  oral  25 mg  completed        
for 30



 succinate      7-10  08-22    tablet    day(s)



             extended    



             release    



             24 hr    



                 



                 

 

 mirtazapine  [id number]  RxNorm  2019-  oral  7.5 mg 1  active   1 tablet  
at



         10-08    tablet    bedtime  for



             at    30 day(s)



             bedtime    



                 



                 

 

 alprazolam  [id number]  RxNorm  2019-  oral  0.5 mg 1  completed   1 tablet



         05-17    tablet    three times a



             three    day  for 30



             times a    day(s)



             day    



                 



                 

 

 desvenlafaxine  [id number]  RxNorm  2018-  oral  100 mg  completed        
for 30



 succinate      2  04-11    tablet    day(s)



             extended    



             release    



             24 hr    



                 



                 

 

 alprazolam  158224  RxNorm  2019-  oral  0.5 mg 1  completed   1 tablet



         06-21    tablet    three times a



             three    day as needed



             times a    for 30 day(s)



             day    



                 



                 

 

 desvenlafaxine  [id number]  RxNorm  2019-  oral  100 mg 1  completed   1 
tablet



 succinate        07-10    tablet    once a day



             extended    for 30 day(s)



             release    



             24 hr    



             once a    



             day    



                 



                 

 

 alprazolam  264304  RxNorm  2019-  oral  0.5 mg 1  completed  Take 1 
tablet



       7-10  08-09    tablet     three times



             three    a day as



             times a    needed for 30



             day    day(s)



                 



                 

 

 alprazolam  383876  RxNorm    2019-  oral  0.5 mg 1  completed   1 tablet



         09-13    tablet    three times a



             three    day  for 30



             times a    day(s)



             day    



                 



                 

 

 alprazolam  [id number]  RxNorm    2019-  oral  0.5 mg  completed        for 
30



       3-21  04-17    tablet    day(s)



                 



                 



                 

 

 mirtazapine  539879  RxNorm  2018-  oral  7.5 mg  completed        for 
30



       2-13  04-11    tablet    day(s)



                 



                 



                 

 

 desvenlafaxine  9315833  RxNorm    2019-  oral  50 mg  active        for 
30



 succinate      8-  11-20    tablet    day(s)



             extended    



             release    



             24 hr    



                 



                 







Problems







 Problem Name  Code  CodeSystem  Alternate  Alternate  Start  End  Status  
Narrative



       Code  CodeSystem  Date  Date    



                 



                 



                 

 

  Recurrent  77318915  SNOMED-CT          Active  



 depressive          3-22      



 disorder,                



 current episode                



 severe without                



 psychotic                



 symptoms                



                 

 

  Nicotine  61114724  SNOMED-CT          Active  



 dependence,          3-      



 unspecified,                



 uncomplicated                



                 

 

  Recurrent  [id number]  SNOMED-CT          Active  



 depressive          3-22      



 disorder,                



 current episode                



 severe without                



 psychotic                



 symptoms                



                 







Relevant diagnostic tests/laboratory data Narrative

No Information



Procedures







 Procedure  Code  CodeSystem  Target  Date of  Status  Service  Device  Device  
Device



 Name      Site  Procedure    Delivery  Code  Name  UID



             Location      



                   



                   

 

 Psychotherap  [id number]  SNOMED-CT   ()  2019  complete  Mental      



 y, 31  88        d  Health-      



 minutes with            [facility]      



 patient when            [address]      



 with an            [facility]      



 evaluation            Street,      



 and            [facility],      



 service            [id number]      



 (List            [id number]      



 separately                  



 in addition                  



 to the code                  



 for primary                  



 procedure)                  



                   



                   

 

 Psychotherap  [id number]  SNOMED-CT   ()  2019  complete  Mental      



 y, 46  5        d  Health-      



 minutes with            [facility]      



 patient            [address],      



             [id number]      



             [id number]      



                   



                   

 

 Psychotherap  [id number]  SNOMED-CT   ()  2019  complete  Mental      



 y, 46  04        d  Health-      



 minutes with            [facility]      



 patient            [address],      



             [id number]      



             [id number]      



                   



                   

 

 Psychotherap  [id number]  SNOMED-CT   ()  2019-10-02  complete  Mental      



 y, 46  04        d  Health-      



 minutes with            [facility]      



 patient            [address],      



             [id number]      



             [id number]      



                   



                   

 

 Psychotherap  [id number]  SNOMED-CT   ()  2019-10-31  complete  Mental      



 y, 46  04        d  Health-      



 minutes with            [address],      



             [id number]      



             [id number]      



                   



                   

 

 Psychotherap  [id number]  SNOMED-CT   ()  2019-11-15  complete  Mental      



 y, 46  04        d  Health-      



 minutes with            [address],      



             [id number]      



             [id number]      



                   



                   

 

 Psychotherap  [id number]  SNOMED-CT   ()  2019  complete  Mental      



 y, 46  04        d  Health-      



 minutes with            [address],      



             [id number]      



             [id number]      



                   



                   

 

 Psychotherap  [id number]  SNOMED-CT   ()  2019  complete  Mental      



 y, 46  04        d  Health-      



 minutes with            [address],      



             [id number]      



             [id number]      



                   



                   

 

 Psychotherap  [id number]  SNOMED-CT   ()  2019  complete  Mental      



 y, 46  04        d  Health-      



 minutes with            [address],      



             [id number]      



             [id number]      



                   



                   

 

 Psychotherap  [id number]  SNOMED-CT   ()  2019  complete  Mental      



 y, 46  04        d  Health-      



 minutes with            [address],      



             [id number]      



             [id number]      



                   



                   

 

 Psychotherap  [id number]  SNOMED-CT   ()  2019  complete  Mental      



 y, 46  04        d  Health-      



 minutes with            [address],      



             [id number]      



             [id number]      



                   



                   

 

 Psychotherap  [id number]  SNOMED-CT   ()  2019  complete  Mental      



 y, 46  04        d  Health-      



 minutes with            [address],      



             [id number]      



             [id number]      



                   



                   

 

 Psychotherap  [id number]  SNOMED-CT   ()  2019  complete  Mental      



 y, 46  04        d  Health-      



 minutes with            [address],      



             [id number]      



             [id number]      



                   



                   

 

 Psychotherap  [id number]  SNOMED-CT   ()  2019  complete  Mental      



 y, 46  04        d  Health-      



 minutes with            [address],      



             [id number]      



             [id number]      



                   



                   

 

 Psychotherap  [id number]  SNOMED-CT   ()  2019  complete  Mental      



 y, 46  04        d  Health-      



 minutes with            [facility]      



 patient            [address],      



             [id number]      



             [id number]      



                   



                   

 

 Psychotherap  [id number]  SNOMED-CT   ()  2019  complete  Mental      



 y, 46  04        d  Health-      



 minutes with            [facility]      



 patient            [address],      



             [id number]      



             [id number]      



                   



                   

 

 Psychotherap  [id number]  SNOMED-CT   ()  2019-10-16  complete  Mental      



 y, 46  04        d  Health-      



 minutes with            [facility]      



 patient            [address],      



             [id number]      



             [phone]      



                   



                   

 

 Psychotherap  [id number]  SNOMED-CT   ()  2019  complete  Mental      



 y, 46  04        d  Health-      



 minutes with            [facility]      



 patient            [address],      



             [id number]      



             [id number]      



                   



                   

 

 Psychotherap  [id number]  SNOMED-CT   ()  2019  complete  Mental      



 y, 46  04        d  Health-      



 minutes with            [facility]      



 patient            [address],      



             [id number]      



             [phone]      



                   



                   

 

 Psychotherap  [id number]  SNOMED-CT   ()  2019  complete  Mental      



 y, 46  04        d  Health-      



 minutes with            [facility]      



 patient            [address],      



             [id number]      



             [phone]      



                   



                   

 

 Group  [id number]  SNOMED-CT   ()  2020  complete  Mental      



 psychotherap  8        d  Health-      



 y (other            [facility]      



 than of a            [address])            [place], [place],      



             [id number]      



             [id number]      



                   



                   

 

 Group  [id number]  SNOMED-CT   ()  2019  complete  Mental      



 psychotherap  8        d  Health-      



 y (other            [facility]      



 than of a            [address])            [place],      



             [id number]      



             [id number]      



                   



                   

 

 Office or  [id number]  SNOMED-CT   ()  2019  complete  Mental      



 other  7        d  Health-      



 outpatient            [facility]      



 visit for            [address],      



 of an            [place],      



 established            [id number]      



 patient,            [id number]      



 which                  



 requires at                  



 least 2 of                  



 these 3 key                  



 components:                  



 An expanded                  



 problem                  



 focused                  



 history; An                  



 expanded                  



 problem                  



 focused                  



 examination;                  



 Medical                  



 decision                  



 making of                  



 low                  



                   

 

 Office or  [id number]  SNOMED-CT   ()  2019  complete  Mental      



 other  7        d  Health-      



 outpatient            [facility]      



 visit for            [address],      



 established            [id number]      



 patient,            [id number]      



 which                  



 requires at                  



 least 2 of                  



 these 3 key                  



 components:                  



 An expanded                  



 problem                  



 focused                  



 history; An                  



 expanded                  



 problem                  



 focused                  



 examination;                  



 Medical                  



 decision                  



 making of                  



 low                  



                   

 

 Office or  [id number]  SNOMED-CT   ()  2019-07-10  complete  Mental      



 other  7        d  Health-      



 outpatient            [facility]      



 visit for            [address],      



 [facility],      



 established            [id number]      



 patient,            [id number]      



 which                  



 requires at                  



 least 2 of                  



 these 3 key                  



 components:                  



 An expanded                  



 problem                  



 focused                  



 history; An                  



 expanded                  



 problem                  



 focused                  



 examination;                  



 Medical                  



 decision                  



 making of                  



 low                  



                   

 

 Office or  [id number]  SNOMED-CT   ()  2019  complete  Mental      



 other  7        d  Health-      



 outpatient            [facility]      



 visit for            [address],      



 established            [id number]      



 patient,            [id number]      



 which                  



 requires at                  



 least 2 of                  



 these 3 key                  



 components:                  



 An expanded                  



 problem                  



 focused                  



 history; An                  



 expanded                  



 problem                  



 focused                  



 examination;                  



 Medical                  



 decision                  



 making of                  



 low                  



                   

 

 Office or  [id number]  SNOMED-CT   ()  2019  complete  Mental      



 other  7        d  Health-      



 outpatient            [facility]      



 visit for            [address],      



 established            [id number]      



 patient,            [id number]      



 which                  



 requires at                  



 least 2 of                  



 these 3 key                  



 components:                  



 An expanded                  



 problem                  



 focused                  



 history; An                  



 expanded                  



 problem                  



 focused                  



 examination;                  



 Medical                  



 decision                  



 making of                  



 low                  



                   

 

 Office or  [id number]  SNOMED-CT   ()  2019-10-10  complete  Mental      



 other  6        d  Health-      



 outpatient            [facility]      



 visit for            [address],      



 established            [id number]      



 patient,            [id number]      



 which                  



 requires at                  



 least 2 of                  



 these 3 key                  



 components:                  



 A problem                  



 focused                  



 history; A                  



 problem                  



 focused                  



 examination;                  



 [employer]                  



 erin medical                  



 decision                  



 making.                  



 Counselin                  



                   



                   

 

 Office or  [id number]  SNOMED-CT   ()  2019  complete  Mental      



 other  6        d  Health-      



 outpatient            [facility]      



 visit for            [address],      



 established            [id number]      



 patient,            [id number]      



 which                  



 requires at                  



 least 2 of                  



 these 3 key                  



 components:                  



 A problem                  



 focused                  



 history; A                  



 problem                  



 focused                  



 examination;                  



 [employer]                  



 erin medical                  



 decision                  



 making.                  



 Meryin                  



                   



                   

 

 Office or  [id number]  SNOMED-CT   ()  2019  complete  Mental      



 other  6        d  Health-      



 outpatient            [facility]      



 visit for            [address],      



 of an            [place],      



 established            [id number]      



 patient,            [id number]      



 which                  



 requires at                  



 least 2 of                  



 these 3 key                  



 components:                  



 A problem                  



 focused                  



 history; A                  



 problem                  



 focused                  



 examination;                  



 [employer]                  



 erin medical                  



 decision                  



 making.                  



 Dmitriy                  



                   



                   

 

     SNOMED-CT   ()  2019  complete  Mental      



           d  [address],      



             [id number]      



             [id number]      



                   



                   

 

     SNOMED-CT   ()  2019  complete  Mental      



           d  [address],      



             [id number]      



             [id number]      



                   



                   

 

     SNOMED-CT   ()  2019  complete  Mental      



           d  Health[address],      



             [id number]      



             [phone]      



                   



                   

 

     SNOMED-CT   ()  2019  complete  Mental      



           d  [address],      



             [id number]      



             [id number]      



                   



                   

 

     SNOMED-CT   ()  2019  complete  Mental      



           d  [address],      



             [id number]      



             [id number]      



                   



                   







Encounters/Encounter Diagnoses







 Encounter  Encounter  Diagnosis  Diagnosis  Diagnosis  Date of  Service



 Name  Code  Code  Name  CodeSystem  Diagnosis  Delivery



             Location



             

 

     [id number]  Recurrent  SNOMED-CT    Behavioral



       depressive      Health



       disorder,      Clinic  , ,



       current      ,



       episode      



       severe      



       without      



       psychotic      



       symptoms      



             







Vital Signs

No Information



Social History







 Element Description  Description  Start  End  Code  CodeSystem  AdditionalInfo



     Date  Date      



             



             



             

 

 SexAssignedAtBirth  Female  -0    F  AdministrativeGender  



     4-26        



             



             



             







Hospital Discharge Instructions







Reason For Referral







Medical Equipment

    FDA





Assessments

## 2020-02-24 NOTE — XMS REPORT
Transition of Care

 Created on:2020



Patient:DORYS LARIOS

Sex:Female

:1950

External Reference #:[id number]





Demographics







 Address  [address] [place] [address]

 

 Home Phone  [phone]

 

 Preferred Language  Unknown

 

 Marital Status  Not  or 

 

 [employer] Affiliation  Unknown

 

 Race  White

 

 Additional Race(s)  Unknown

 

 Ethnic Group  Not  or 









Author







 Organization  [facility]









Support







 Name  Relationship  Address  Phone

 

 Unavailable  Unavailable  Unavailable  Unavailable









Care Team Providers







 Name  Role  Phone

 

 MOIRA DAVEY  Primary Care Physician  Unavailable









Allergies, Adverse Reactions, Alerts







 Allergy  Code  CodeSystem  Reaction  Severity  Criticality  Status  Start



 Substance              Date



               



               

 

         Moderate      



               



               







Medications







 Medication  Medication  Medication  Start  Stop  Route  Dose  Status  Fill



   Code  CodeSystem  Date  Date        Instructions



                 



                 



                 

 

 alprazolam  894887  RxNorm  2019-  oral  0.5 mg 1  completed  Take 1 
tablet



       7-10  08-09    tablet     three times



             three    a day as



             times a    needed for 30



             day    day(s)



                 



                 

 

 alprazolam  [id number]  RxNorm  2019-  oral  0.5 mg  completed        for 
30



       3-  04-17    tablet    day(s)



                 



                 



                 

 

 desvenlafaxine  [id number]  RxNorm  2019-  oral  50 mg  active        for 
30



 succinate      8-22  11-20    tablet    day(s)



             extended    



             release    



             24 hr    



                 



                 

 

 alprazolam  [id number]  RxNorm  2019-  oral  0.5 mg 1  completed   1 tablet



         06-21    tablet    three times a



             three    day as needed



             times a    for 30 day(s)



             day    



                 



                 

 

 mirtazapine  071921  RxNorm  2019-  oral  7.5 mg 1  active   1 tablet  
at



       4-11  10-08    tablet    bedtime  for



             at    30 day(s)



             bedtime    



                 



                 

 

 alprazolam  086475  RxNorm    2019-  oral  0.5 mg 1  active  Take 1 
tablet



       9-19  10-19    tablet     three times



             three    a day as



             times a    needed for 30



             day    day(s)



                 



                 

 

 alprazolam  278276  RxNorm  2019-  oral  0.5 mg 1  completed   1 tablet



       4-17  05-17    tablet    three times a



             three    day  for 30



             times a    day(s)



             day    



                 



                 

 

 desvenlafaxine  9954734  RxNorm  2018-  oral  100 mg  completed        
for 30



 succinate      2-13  04-11    tablet    day(s)



             extended    



             release    



             24 hr    



                 



                 

 

 mirtazapine  647967  RxNorm  2018-  oral  7.5 mg  completed        for 
30



       2-13  04-11    tablet    day(s)



                 



                 



                 

 

 desvenlafaxine  6439178  RxNorm  2019-  oral  25 mg  completed        
for 30



 succinate      7-10  08-    tablet    day(s)



             extended    



             release    



             24 hr    



                 



                 

 

 alprazolam  [id number]  RxNorm  2019-  oral  0.5 mg 1  completed   1 tablet



           tablet    three times a



             three    day  for 30



             times a    day(s)



             day    



                 



                 

 

 desvenlafaxine  5233078  RxNorm  2019-  oral  100 mg 1  completed   1 
tablet



 succinate      4-11  07-10    tablet    once a day



             extended    for 30 day(s)



             release    



             24 hr    



             once a    



             day    



                 



                 







Problems







 Problem Name  Code  CodeSystem  Alternate  Alternate  Start  End  Status  
Narrative



       Code  CodeSystem  Date  Date    



                 



                 



                 

 

  Nicotine  39087002  SNOMED-CT          Active  



 dependence,          3-      



 unspecified,                



 uncomplicated                



                 

 

  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  



 depressive          3-22      



 disorder,                



 current episode                



 severe without                



 psychotic                



 symptoms                



                 

 

  Recurrent  [id number]  SNOMED-CT          Active  



 depressive          3-22      



 disorder,                



 current episode                



 severe without                



 psychotic                



 symptoms                



                 







Relevant diagnostic tests/laboratory data Narrative

No Information



Procedures







 Procedure  Code  CodeSystem  Target  Date of  Status  Service  Device  Device  
Device



 Name      Site  Procedure    Delivery  Code  Name  UID



             Location      



                   



                   

 

 Psychotherap  117312  SNOMED-CT   ()  2019  complete  Mental      



 y, 31  88        d  Health-      



 minutes with            [facility]      



 patient when            [address]      



 with an            [facility]      



 evaluation            Street,      



 and            [facility],      



 service            [id number]      



 (List            [id number]      



 separately                  



 in addition                  



 to the code                  



 for primary                  



 procedure)                  



                   



                   

 

     SNOMED-CT   ()  2019  complete  Mental      



           d  Health-      



             [address],      



             [id number]      



             [id number]      



                   



                   

 

     SNOMED-CT   ()  2019  complete  Mental      



           d  Health-      



             [address],      



             [id number]      



             [id number]      



                   



                   

 

     SNOMED-CT   ()  2019  complete  Mental      



           d  Health-      



             [address],      



             [id number]      



             [id number]      



                   



                   

 

     SNOMED-CT   ()  2019  complete  Mental      



           d  Health-      



             [address],      



             [id number]      



             [id number]      



                   



                   

 

     SNOMED-CT   ()  2019  complete  Mental      



           d  Health-      



             [address],      



             [id number]      



             [id number]      



                   



                   

 

 Psychotherap  [id number]  SNOMED-CT   ()  2019  complete  Mental      



 y, 46  5        d  Health-      



 minutes with            [address],      



             [id number]      



             [id number]      



                   



                   

 

 Psychotherap  [id number]  SNOMED-CT   ()  2019  complete  Mental      



 y, 46  04        d  Health-      



 minutes with            [address],      



             [id number]      



             [id number]      



                   



                   

 

 Psychotherap  [id number]  SNOMED-CT   ()  2019-10-02  complete  Mental      



 y, 46  04        d  Health-      



 minutes with            [address],      



             [id number]      



             [id number]      



                   



                   

 

 Psychotherap  [id number]  SNOMED-CT   ()  2019-10-31  complete  Mental      



 y, 46  04        d  Health-      



 minutes with            [address],      



             [id number]      



             [id number]      



                   



                   

 

 Psychotherap  [id number]  SNOMED-CT   ()  2019-11-15  complete  Mental      



 y, 46  04        d  Health-      



 minutes with            [address],      



             [id number]      



             [id number]      



                   



                   

 

 Psychotherap  [id number]  SNOMED-CT   ()  2019  complete  Mental      



 y, 46  04        d  Health-      



 minutes with            [address],      



             [id number]      



             [id number]      



                   



                   

 

 Psychotherap  [id number]  SNOMED-CT   ()  2019  complete  Mental      



 y, 46  04        d  Health-      



 minutes with            [address],      



             [id number]      



             [id number]      



                   



                   

 

 Psychotherap  [id number]  SNOMED-CT   ()  2019  complete  Mental      



 y, 46  04        d  Health-      



 minutes with            [address],      



             [id number]      



             [id number]      



                   



                   

 

 Psychotherap  [id number]  SNOMED-CT   ()  2019-10-16  complete  Mental      



 y, 46  04        d  Health-      



 minutes with            [address],      



             [id number]      



             [id number]      



                   



                   

 

 Psychotherap  [id number]  SNOMED-CT   ()  2019  complete  Mental      



 y, 46  04        d  Health-      



 minutes with            [address],      



             [id number]      



             [id number]      



                   



                   

 

 Psychotherap  [id number]  SNOMED-CT   ()  2020-01-15  complete  Mental      



 y, 46  04        d  Health-      



 minutes with            [address],      



             [id number]      



             [id number]      



                   



                   

 

 Psychotherap  [id number]  SNOMED-CT   ()  2019  complete  Mental      



 y, 46  04        d  Health-      



 minutes with            [address],      



             [id number]      



             [id number]      



                   



                   

 

 Psychotherap  [id number]  SNOMED-CT   ()  2019  complete  Mental      



 y, 46  04        d  Health-      



 minutes with            [address],      



             [id number]      



             [id number]      



                   



                   

 

 Psychotherap  [id number]  SNOMED-CT   ()  2019  complete  Mental      



 y, 46  04        d  Health-      



 minutes with            [address],      



             [id number]      



             [id number]      



                   



                   

 

 Psychotherap  [id number]  SNOMED-CT   ()  2019  complete  Mental      



 y, 46  04        d  Health-      



 minutes with            [address],      



             [id number]      



             [id number]      



                   



                   

 

 Psychotherap  [id number]  SNOMED-CT   ()  2019  complete  Mental      



 y, 46  04        d  Health-      



 minutes with            [address],      



             [id number]      



             [id number]      



                   



                   

 

 Psychotherap  [id number]  SNOMED-CT   ()  2019  complete  Mental      



 y, 46  04        d  Health-      



 minutes with            [address],      



             [id number]      



             [phone]      



                   



                   

 

 Psychotherap  [id number]  SNOMED-CT   ()  2019  complete  Mental      



 y, 46  04        d  Health-      



 minutes with            [address],      



             [id number]      



             [id number]      



                   



                   

 

 Psychotherap  [id number]  SNOMED-CT   ()  2019  complete  Mental      



 y, 46  04        d  Health-      



 minutes with            [address],      



             [id number]      



             [phone]      



                   



                   

 

 Psychotherap  [id number]  SNOMED-CT   ()  2019  complete  Mental      



 y, 46  04        d  Health-      



 minutes with            [facility]      



 patient            [address],      



             [id number]      



             [phone]      



                   



                   

 

 Group  [id number]  SNOMED-CT   ()  2020-01-15  complete  Mental      



 psychotherap  8        d  Health-      



 y (other            [facility]      



 than of a            [address] group)            [place], [place],      



             [id number]      



             [id number]      



                   



                   

 

 Group  [id number]  SNOMED-CT   ()  2019  complete  Mental      



 psychotherap  8        d  Health-      



 y (other            [facility]      



 than of a            [address] group)            [place], [place],      



             [id number]      



             [id number]      



                   



                   

 

 Group  [id number]  SNOMED-CT   ()  2020  complete  Mental      



 psychotherap  8        d  Health-      



 y (other            [facility]      



 than of a            [address] group)            [place], [place],      



             [id number]      



             [id number]      



                   



                   

 

 Office or  [id number]  SNOMED-CT   ()  2019  complete  Mental      



 other  7        d  Health-      



 outpatient            [facility]      



 visit for            [address],      



 established            [id number]      



 patient,            [id number]      



 which                  



 requires at                  



 least 2 of                  



 these 3 key                  



 components:                  



 An expanded                  



 problem                  



 focused                  



 history; An                  



 expanded                  



 problem                  



 focused                  



 examination;                  



 Medical                  



 decision                  



 making of                  



 low                  



                   

 

 Office or  [id number]  SNOMED-CT   ()  2019  complete  Mental      



 other  7        d  Health-      



 outpatient            [facility]      



 visit for            [address],      



 \[facility]\""            [id number]      



 patient,            [id number]      



 which                  



 requires at                  



 least 2 of                  



 these 3 key                  



 components:                  



 An expanded                  



 problem                  



 focused                  



 history; An                  



 expanded                  



 problem                  



 focused                  



 examination;                  



 Medical                  



 decision                  



 making of                  



 low                  



                   

 

 Office or  [id number]  SNOMED-CT   ()  2019-07-10  complete  Mental      



 other  7        d  Health-      



 outpatient            [facility]      



 visit for            [address],      



 established            [id number]      



 patient,            [id number]      



 which                  



 requires at                  



 least 2 of                  



 these 3 key                  



 components:                  



 An expanded                  



 problem                  



 focused                  



 history; An                  



 expanded                  



 problem                  



 focused                  



 examination;                  



 Medical                  



 decision                  



 making of                  



 low                  



                   

 

 Office or  [id number]  SNOMED-CT   ()  2019  complete  Mental      



 other  7        d  Health-      



 outpatient            [facility]      



 visit for            [address],      



 established            [id number]      



 patient,            [id number]      



 which                  



 requires at                  



 least 2 of                  



 these 3 key                  



 components:                  



 An expanded                  



 problem                  



 focused                  



 history; An                  



 expanded                  



 problem                  



 focused                  



 examination;                  



 Medical                  



 decision                  



 making of                  



 low                  



                   

 

 Office or  [id number]  SNOMED-CT   ()  2019  complete  Mental      



 other  7        d  Health-      



 outpatient            [facility]      



 visit for            [address],      



 established            [id number]      



 patient,            [id number]      



 which                  



 requires at                  



 least 2 of                  



 these 3 key                  



 components:                  



 An expanded                  



 problem                  



 focused                  



 history; An                  



 expanded                  



 problem                  



 focused                  



 examination;                  



 Medical                  



 decision                  



 making of                  



 low                  



                   

 

 Office or  [id number]  SNOMED-CT   ()  2019-10-10  complete  Mental      



 other  6        d  Health-      



 outpatient            [facility]      



 visit for            [address],      



 established            [id number]      



 patient,            [id number]      



 which                  



 requires at                  



 least 2 of                  



 these 3 key                  



 components:                  



 A problem                  



 focused                  



 history; A                  



 problem                  



 focused                  



 examination;                  



 [employer]                  



 erin medical                  



 decision                  



 making.                  



 Counselin                  



                   



                   

 

 Office or  [id number]  SNOMED-CT   ()  2019  complete  Mental      



 other  6        d  Health-      



 outpatient            [facility]      



 visit for            [address],      



 established            [id number]      



 patient,            [id number]      



 which                  



 requires at                  



 least 2 of                  



 these 3 key                  



 components:                  



 A problem                  



 focused                  



 history; A                  



 problem                  



 focused                  



 examination;                  



 [employer]                  



 erin medical                  



 decision                  



 making.                  



 Counselin                  



                   



                   

 

 Office or  [id number]  SNOMED-CT   ()  2019  complete  Mental      



 other  6        d  Health-      



 outpatient            [facility]      



 visit for            [address],      



 established            [id number]      



 patient,            [id number]      



 which                  



 requires at                  



 least 2 of                  



 these 3 key                  



 components:                  



 A problem                  



 focused                  



 history; A                  



 problem                  



 focused                  



 examination;                  



 [employer]                  



 erin medical                  



 decision                  



 making.                  



 Counselin                  



                   



                   







Encounters/Encounter Diagnoses







 Encounter  Encounter  Diagnosis  Diagnosis  Diagnosis  Date of  Service



 Name  Code  Code  Name  CodeSystem  Diagnosis  Delivery



             Location



             

 

     02061812  Recurrent  SNOMED-CT    Behavioral



       depressive      Health



       disorder,      Clinic  , ,



       current      ,



       episode      



       severe      



       without      



       psychotic      



       symptoms      



             







Vital Signs

No Information



Social History







 Element Description  Description  Start  End  Code  CodeSystem  AdditionalInfo



     Date  Date      



             



             



             

 

 SexAssignedAtBirth  Female      F  AdministrativeGender  



             



             



             



             







Hospital Discharge Instructions







Reason For Referral







Medical Equipment

    FDA





Assessments

## 2020-02-24 NOTE — XMS REPORT
Transition of Care

 Created on:2020



Patient:DORYS LARIOS

Sex:Female

:1950

External Reference #:[id number]





Demographics







 Address  [address] [place] [address]

 

 Home Phone  [phone]

 

 Preferred Language  Unknown

 

 Marital Status  Not  or 

 

 Mu-ism Affiliation  Unknown

 

 Race  White

 

 Additional Race(s)  Unknown

 

 Ethnic Group  Not  or 









Author







 Organization  [facility]









Support







 Name  Relationship  Address  Phone

 

 Unavailable  Unavailable  Unavailable  Unavailable









Care Team Providers







 Name  Role  Phone

 

 MOIRA DAVEY  Primary Care Physician  Unavailable









Allergies, Adverse Reactions, Alerts







 Allergy  Code  CodeSystem  Reaction  Severity  Criticality  Status  Start



 Substance              Date



               



               

 

         Moderate      



               



               







Medications







 Medication  Medication  Medication  Start  Stop  Route  Dose  Status  Fill



   Code  CodeSystem  Date  Date        Instructions



                 



                 



                 

 

 alprazolam  363922  RxNorm  2019-  oral  0.5 mg 1  completed   1 tablet



       814  09-13    tablet    three times a



             three    day  for 30



             times a    day(s)



             day    



                 



                 

 

 alprazolam  922179  RxNorm  2019-  oral  0.5 mg 1  completed   1 tablet



         06-21    tablet    three times a



             three    day as needed



             times a    for 30 day(s)



             day    



                 



                 

 

 mirtazapine  893712  RxNorm  2018-  oral  7.5 mg  completed        for 
30



         04-11    tablet    day(s)



                 



                 



                 

 

 alprazolam  [id number]  RxNorm  2019-  oral  0.5 mg 1  completed  Take 1 
tablet



       7-10  08-09    tablet     three times



             three    a day as



             times a    needed for 30



             day    day(s)



                 



                 

 

 alprazolam  724145  RxNorm  2019-  oral  0.5 mg 1  completed   1 tablet



       4-17  05-17    tablet    three times a



             three    day  for 30



             times a    day(s)



             day    



                 



                 

 

 desvenlafaxine  0094020  RxNorm  2019-  oral  50 mg  active        for 
30



 succinate      8-  11-20    tablet    day(s)



             extended    



             release    



             24 hr    



                 



                 

 

 alprazolam  212159  RxNorm  2019-  oral  0.5 mg 1  active  Take 1 
tablet



       9-19  10-19    tablet     three times



             three    a day as



             times a    needed for 30



             day    day(s)



                 



                 

 

 desvenlafaxine  5180564  RxNorm  2018-  oral  100 mg  completed        
for 30



 succinate      2-  04-11    tablet    day(s)



             extended    



             release    



             24 hr    



                 



                 

 

 alprazolam  587135  RxNorm  2019-0  2019-  oral  0.5 mg  completed        for 
30



       3-21  04-17    tablet    day(s)



                 



                 



                 

 

 desvenlafaxine  8402326  RxNorm    2019-  oral  25 mg  completed        
for 30



 succinate      7-10  08-22    tablet    day(s)



             extended    



             release    



             24 hr    



                 



                 

 

 desvenlafaxine  2482340  RxNorm  2019-  oral  100 mg 1  completed   1 
tablet



 succinate      -10    tablet    once a day



             extended    for 30 day(s)



             release    



             24 hr    



             once a    



             day    



                 



                 

 

 mirtazapine  110224  RxNorm  2019-  oral  7.5 mg 1  active   1 tablet  
at



       4-11  10-    tablet    bedtime  for



             at    30 day(s)



             bedtime    



                 



                 







Problems







 Problem Name  Code  CodeSystem  Alternate  Alternate  Start  End  Status  
Narrative



       Code  CodeSystem  Date  Date    



                 



                 



                 

 

  Recurrent  74618341  SNOMED-CT          Active  



 depressive          3-22      



 disorder,                



 current episode                



 severe without                



 psychotic                



 symptoms                



                 

 

  Nicotine  86707423  SNOMED-CT          Active  



 dependence,          3-27      



 unspecified,                



 uncomplicated                



                 

 

  Recurrent  44384741  SNOMED-CT          Active  



 depressive          3-22      



 disorder,                



 current episode                



 severe without                



 psychotic                



 symptoms                



                 







Relevant diagnostic tests/laboratory data Narrative

No Information



Procedures







 Procedure  Code  CodeSystem  Target  Date of  Status  Service  Device  Device  
Device



 Name      Site  Procedure    Delivery  Code  Name  UID



             Location      



                   



                   

 

 Psychotherap  [id number]  SNOMED-CT   ()  2019  complete  Mental      



 y, 31  88        d  Health-      



 minutes with            [facility]      



 patient when            [address]      



 with an            [facility]      



 evaluation            Street,      



 and            [facility] [place]      



 service            [id number]      



 (List            [id number]      



 separately                  



 in addition                  



 to the code                  



 for primary                  



 procedure)                  



                   



                   

 

 Psychotherap  623861  SNOMED-CT   ()  2019  complete  Mental      



 y, 46  5        d  Health-      



 minutes with            [facility]      



 patient            [address],      



             [id number]      



             [id number]      



                   



                   

 

 Psychotherap  [id number]  SNOMED-CT   ()  2019  complete  Mental      



 y, 46  04        d  Health-      



 minutes with            [facility]      



 patient            [address],      



             [id number]      



             [id number]      



                   



                   

 

 Psychotherap  736802  SNOMED-CT   ()  2019-10-02  complete  Mental      



 y, 46  04        d  Health-      



 minutes with            [facility]      



 patient            [address],      



             [id number]      



             [id number]      



                   



                   

 

 Psychotherap  604110  SNOMED-CT   ()  2019-10-31  complete  Mental      



 y, 46  04        d  Health-      



 minutes with            [address],      



             [id number]      



             [id number]      



                   



                   

 

 Psychotherap  [id number]  SNOMED-CT   ()  2019-11-15  complete  Mental      



 y, 46  04        d  Health-      



 minutes with            [address],      



             [id number]      



             [id number]      



                   



                   

 

 Psychotherap  [id number]  SNOMED-CT   ()  2019  complete  Mental      



 y, 46  04        d  Health-      



 minutes with            [address],      



             [id number]      



             [id number]      



                   



                   

 

 Psychotherap  240704  SNOMED-CT   ()  2019  complete  Mental      



 y, 46  04        d  Health-      



 minutes with            [address],      



             [id number]      



             [id number]      



                   



                   

 

 Psychotherap  561629  SNOMED-CT   ()  2019  complete  Mental      



 y, 46  04        d  Health-      



 minutes with            [address],      



             [id number]      



             [id number]      



                   



                   

 

 Psychotherap  326500  SNOMED-CT   ()  2019  complete  Mental      



 y, 46  04        d  Health-      



 minutes with            [address],      



             [id number]      



             [id number]      



                   



                   

 

 Psychotherap  [id number]  SNOMED-CT   ()  2019  complete  Mental      



 y, 46  04        d  Health-      



 minutes with            [address],      



             [id number]      



             [id number]      



                   



                   

 

 Psychotherap  162891  SNOMED-CT   ()  2019  complete  Mental      



 y, 46  04        d  Health-      



 minutes with            [address],      



             [id number]      



             [id number]      



                   



                   

 

 Psychotherap  404813  SNOMED-CT   ()  2019  complete  Mental      



 y, 46  04        d  Health-      



 minutes with            [address],      



             [id number]      



             [id number]      



                   



                   

 

 Psychotherap  662924  SNOMED-CT   ()  2019  complete  Mental      



 y, 46  04        d  Health-      



 minutes with            [address],      



             [id number]      



             [id number]      



                   



                   

 

 Psychotherap  [id number]  SNOMED-CT   ()  2019  complete  Mental      



 y, 46  04        d  Health-      



 minutes with            [place]      



 patient            [address],      



             [id number]      



             [phone]      



                   



                   

 

 Psychotherap  [id number]  SNOMED-CT   ()  2019  complete  Mental      



 y, 46  04        d  Health-      



 minutes with            [facility]      



 patient            [address],      



             [id number]      



             [id number]      



                   



                   

 

 Psychotherap  [id number]  SNOMED-CT   ()  2019  complete  Mental      



 y, 46  04        d  Health-      



 minutes with            [facility]      



 patient            [address],      



             [id number]      



             [phone]      



                   



                   

 

 Psychotherap  958520  SNOMED-CT   ()  2019  complete  Mental      



 y, 46  04        d  Health-      



 minutes with            [facility]      



 patient            [address],      



             [id number]      



             [id number]      



                   



                   

 

 Psychotherap  124511  SNOMED-CT   ()  2019-10-16  complete  Mental      



 y, 46  04        d  Health-      



 minutes with            [place]      



 patient            [address],      



             [id number]      



             [id number]      



                   



                   

 

 Psychotherap  178036  SNOMED-CT   ()  2019  complete  Mental      



 y, 46  04        d  Health-      



 minutes with            [place]      



 patient            [address],      



             [id number]      



             [id number]      



                   



                   

 

 Psychotherap  663887  SNOMED-CT   ()  2020-01-15  complete  Mental      



 y, 46  04        d  Health-      



 minutes with            [facility]      



 patient            [address],      



             [id number]      



             [id number]      



                   



                   

 

 Psychotherap  993486  SNOMED-CT   ()  2020  complete  Mental      



 y, 46  04        d  Health-      



 minutes with            [place]      



 patient            [address],      



             [id number]      



             [phone]      



                   



                   

 

 Group  [id number]  SNOMED-CT   ()  2020  complete  Mental      



 psychotherap  8        d  Health-      



 y (other            [place]      



 than of a            County      



 [address],      



             [id number]      



             [phone]      



                   



                   

 

 Group  [id number]  SNOMED-CT   ()  2020-01-15  complete  Mental      



 psychotherap  8        d  Health-      



 y (other            [place]      



 than of a            [address] group)            [place], [place],      



             [id number]      



             [phone]      



                   



                   

 

 Group  [id number]  SNOMED-CT   ()  2020  complete  Mental      



 psychotherap  8        d  Health-      



 y (other            [facility]      



 than of a            [address] group)            [place], [place],      



             [id number]      



             [id number]      



                   



                   

 

 Group  [id number]  SNOMED-CT   ()  2019  complete  Mental      



 psychotherap  8        d  Health-      



 y (other            [place]      



 than of a            [address] group)            [place], [place],      



             [id number]      



             [id number]      



                   



                   

 

 Office or  [id number]  SNOMED-CT   ()  2019  complete  Mental      



 other  7        d  Health-      



 outpatient            [facility]      



 visit for            [address]            [facility],      



 established            [id number]      



 patient,            [id number]      



 which                  



 requires at                  



 least 2 of                  



 these 3 key                  



 components:                  



 An expanded                  



 problem                  



 focused                  



 history; An                  



 expanded                  



 problem                  



 focused                  



 examination;                  



 Medical                  



 decision                  



 making of                  



 low                  



                   

 

 Office or  [id number]  SNOMED-CT   ()  2019  complete  Mental      



 other  7        d  Health-      



 outpatient            [place]      



 visit for            [address]            [facility],      



 established            [id number]      



 patient,            [id number]      



 which                  



 requires at                  



 least 2 of                  



 these 3 key                  



 components:                  



 An expanded                  



 problem                  



 focused                  



 history; An                  



 expanded                  



 problem                  



 focused                  



 examination;                  



 Medical                  



 decision                  



 making of                  



 low                  



                   

 

 Office or  [id number]  SNOMED-CT   ()  2019-07-10  complete  Mental      



 other  7        d  Health-      



 outpatient            [place]      



 visit for            [address]            [facility],      



 established            [id number]      



 patient,            [id number]      



 which                  



 requires at                  



 least 2 of                  



 these 3 key                  



 components:                  



 An expanded                  



 problem                  



 focused                  



 history; An                  



 expanded                  



 problem                  



 focused                  



 examination;                  



 Medical                  



 decision                  



 making of                  



 low                  



                   

 

 Office or  [id number]  SNOMED-CT   ()  2019  complete  Mental      



 other  7        d  Health-      



 outpatient            [place]      



 visit for            [address]            [facility],      



 established            [id number]      



 patient,            [id number]      



 which                  



 requires at                  



 least 2 of                  



 these 3 key                  



 components:                  



 An expanded                  



 problem                  



 focused                  



 history; An                  



 expanded                  



 problem                  



 focused                  



 examination;                  



 Medical                  



 decision                  



 making of                  



 low                  



                   

 

 Office or  [id number]  SNOMED-CT   ()  2019  complete  Mental      



 other  7        d  Health-      



 outpatient            [facility]      



 visit for            [address],      



 established            [id number]      



 patient,            [id number]      



 which                  



 requires at                  



 least 2 of                  



 these 3 key                  



 components:                  



 An expanded                  



 problem                  



 focused                  



 history; An                  



 expanded                  



 problem                  



 focused                  



 examination;                  



 Medical                  



 decision                  



 making of                  



 low                  



                   

 

 Office or  [id number]  SNOMED-CT   ()  2019-10-10  complete  Mental      



 other  6        d  Health-      



 outpatient            [place]      



 visit for            [address],      



 established            [id number]      



 patient,            [id number]      



 which                  



 requires at                  



 least 2 of                  



 these 3 key                  



 components:                  



 A problem                  



 focused                  



 history; A                  



 problem                  



 focused                  



 examination;                  



 [employer]                  



 erin medical                  



 decision                  



 making.                  



 Counselin                  



                   



                   

 

 Office or  [id number]  SNOMED-CT   ()  2019  complete  Mental      



 other  6        d  Health-      



 outpatient            [facility]      



 visit for            [address],      



 established            [id number]      



 patient,            [id number]      



 which                  



 requires at                  



 least 2 of                  



 these 3 key                  



 components:                  



 A problem                  



 focused                  



 history; A                  



 problem                  



 focused                  



 examination;                  



 [employer]                  



 erin medical                  



 decision                  



 making.                  



 Counselin                  



                   



                   

 

 Office or  [id number]  SNOMED-CT   ()  2019  complete  Mental      



 other  6        d  Health-      



 outpatient            [place]      



 visit for            [address],      



 established            [id number]      



 patient,            [id number]      



 which                  



 requires at                  



 least 2 of                  



 these 3 key                  



 components:                  



 A problem                  



 focused                  



 history; A                  



 problem                  



 focused                  



 examination;                  



 [employer]                  



 erin medical                  



 decision                  



 making.                  



 Counselin                  



                   



                   

 

     SNOMED-CT   ()  2019  complete  Mental      



           d  Health-      



             [facility],      



             [id number]      



             [id number]      



                   



                   

 

     SNOMED-CT   ()  2019  complete  Mental      



           d  Health-      



             [facility],      



             [id number]      



             [id number]      



                   



                   

 

     SNOMED-CT   ()  2019  complete  Mental      



           d  Health-      



             [facility],      



             [id number]      



             [id number]      



                   



                   

 

     SNOMED-CT   ()  2019  complete  Mental      



           d  Health-      



             [address],      



             [id number]      



             [phone]      



                   



                   

 

     SNOMED-CT   ()  2019  [address],      



             [id number]      



             [id number]      



                   



                   







Encounters/Encounter Diagnoses







 Encounter  Encounter  Diagnosis  Diagnosis  Diagnosis  Date of  Service



 Name  Code  Code  Name  CodeSystem  Diagnosis  Delivery



             Location



             

 

     [id number]  Recurrent  SNOMED-CT    Behavioral



       depressive      Health



       disorder,      Clinic  , ,



       current      ,



       episode      



       severe      



       without      



       psychotic      



       symptoms      



             







Vital Signs

No Information



Social History







 Element Description  Description  Start  End  Code  CodeSystem  AdditionalInfo



     Date  Date      



             



             



             

 

 SexAssignedAtBirth  Female  1    F  AdministrativeGender  



             



             



             



             







Hospital Discharge Instructions







Reason For Referral







Medical Equipment

    FDA





Assessments

## 2020-02-24 NOTE — ED
Abdominal Pain/Female





- HPI Summary


HPI Summary: 





Patient is a 68 y/o F presenting to the ED for a chief complaint of diffuse 

abdominal pain, worse on the right side, which began one week ago and worsened 

on 02/21/20. Patient complains of chills, nausea, vomiting, and diarrhea. The 

diarrhea is described as liquid and mucous. No recent abx use or 

hospitalizations. She denies fever, dysuria, hematuria, or blood in the stool. 

No aggravating or alleviating factors are reported. Patient notes a history of 

SI, but denies any at the present time. She admits several suicide attempts in 

the past. Patient denies prior admissions. Any significant PMHx is denied. Does 

see GI for IBS. FMHx of glaucoma and rheumatoid arthritis is noted, but DM and 

HTN is denied. 








- History of Current Complaint


Chief Complaint: EDNauseaVomitDiarrh


Stated Complaint: N/V/D PER PT


Time Seen by Provider: 02/24/20 13:50


Hx Obtained From: Patient


Onset/Duration: Sudden Onset, Lasting Days, Still Present


Timing: Constant


Severity Initially: Severe


Severity Currently: Severe


Pain Intensity: 7


Pain Scale Used: 0-10 Numeric


Location: Diffuse


Radiates: No


Aggravating Factor(s): Nothing


Alleviating Factor(s): Nothing


Associated Signs and Symptoms: Positive: Nausea, Vomiting, Diarrhea.  Negative: 

Fever, Blood in Stool, Urinary Symptoms - Negative dysuria or hematuria


Allergies/Adverse Reactions: 


 Allergies











Allergy/AdvReac Type Severity Reaction Status Date / Time


 


codeine AdvReac  GI Upset Verified 02/24/20 12:24


 


erythromycin base AdvReac  GI Upset Verified 02/24/20 12:24











Home Medications: 


 Home Medications





ALPRAZolam TAB* [Xanax TAB*] 0.5 mg PO TID PRN MDD 3 tabs 10/11/12 [History 

Confirmed 02/24/20]


Aspirin 81 mg CHEW TAB* 81 mg PO DAILY 06/15/17 [History Confirmed 02/24/20]


Mirtazapine TAB* [Remeron TAB*] 7.5 mg PO BEDTIME 06/15/17 [History Confirmed 02 /24/20]


Rosuvastatin Calcium [Crestor] 10 mg PO QPM 06/15/17 [History Confirmed 02/24/20

]


Desvenlafaxine Succinate [Pristiq] 50 mg PO QAM 12/07/18 [History Confirmed 02/ 24/20]


Desvenlafaxine(NF) [Pristiq(NF)] 100 mg PO DAILY 02/24/20 [History Confirmed 02/ 24/20]


Methylcellulose [Citrucel] 500 mg PO DAILY 02/24/20 [History Confirmed 02/24/20]


Metoclopramide TAB* [Reglan TAB*] 5 mg PO Q8H PRN 4 Days #12 tab MDD 3 02/24/20 

[Rx]


Sodium Chloride TAB* 1 gm PO DAILY 02/24/20 [History Confirmed 02/24/20]


Topiramate TAB(*) [Topamax 25 MG tab] 25 mg PO QPM 02/24/20 [History Confirmed 

02/24/20]


Topiramate [Topiramate ER 50 mg cap] 50 mg PO BID 02/24/20 [History Confirmed 02 /24/20]











PMH/Surg Hx/FS Hx/Imm Hx


Previously Healthy: Yes


Endocrine/Hematology History: 


   Denies: Hx Anticoagulant Therapy, Hx Diabetes, Hx Thyroid Disease


Cardiovascular History: Reports: Other Cardiovascular Problems/Disorders - 

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


   Denies: Hx Angina, Hx Coronary Artery Disease, Hx Hypercholesterolemia, Hx 

Hypertension, Hx Myocardial Infarction, Hx Pacemaker/ICD, Hx Peripheral 

Vascular Disease, Hx Valvular Heart Disease


Respiratory History: Reports: Hx Sleep Apnea


   Denies: Hx Asthma, Hx Chronic Obstructive Pulmonary Disease (COPD)


GI History: Reports: Hx Gall Bladder Disease - Cholecystectomy, Hx 

Gastroesophageal Reflux Disease - ON MEDICATION FOR, Hx Irritable Bowel, Other 

GI Disorders - DIARRHEA


   Denies: Hx Ulcer


 History: Reports: Hx Renal Disease - hematuria and previous abnormal ct


   Denies: Hx Dialysis


Musculoskeletal History: 


   Denies: Hx Arthritis, Hx Osteoporosis


Sensory History: Reports: Hx Cataracts, Hx Contacts or Glasses - glasses


   Denies: Hx Glaucoma, Hx Legally Blind, Hx Deafness, Hx Hearing Aid


Opthamlomology History: Reports: Hx Cataracts, Hx Contacts or Glasses - glasses


   Denies: Hx Glaucoma, Hx Legally Blind


EENT History: 


   Denies: Hx Deafness


Neurological History: Reports: Hx Headaches, Hx Migraine - TREATS WITH 

ACETAMINOPHEN, Hx Seizures - LAST 10 YEARS AGO- NO MEDICATION FOR, Other Neuro 

Impairments/Disorders - hx of suicide attempts - last one 4/19/16


   Denies: Hx Dementia, Hx Transient Ischemic Attacks (TIA)


Psychiatric History: Reports: Hx Anxiety - ON MEDICATION FOR, Hx Depression - 

ON MEDICATION FOR, Hx Inpatient Treatment, Hx Suicide Attempt


   Denies: Hx Eating Disorder, Hx Panic Disorder, Hx of Violent Episodes 

Against Others, Hx Substance Abuse





- Cancer History


Hx Chemotherapy: No


Hx Radiation Therapy: No





- Surgical History


Surgical History: Yes


Surgery Procedure, Year, and Place: Tracheostomy 1971.  Hysterectomy 1993   

ADDITIONAL ABD SURG POST HYSTERECTOMY COMPLICATIONS - ADHESIONS.  

Cholecystectomy 1993.  UTERINE SUSPENSION - 1970s.  BILATERAL CATATRACTS - 

2010.  CARDIAC CATH - NO STENTS - 1993 - CAUTERIZE NODE ON HEART


Hx Anesthesia Reactions: No


Infectious Disease History: No


Infectious Disease History: 


   Denies: Hx Hepatitis, Hx Human Immunodeficiency Virus (HIV), Traveled 

Outside the [place] in Last 30 Days





- Family History


Known Family History: Positive: Other - breast cancer, rheumatoid arthritis, 

glaucoma


   Negative: Hypertension, Diabetes





- Social History


Occupation: Retired


Alcohol Use: Rare


Hx Substance Use: No


Substance Use Type: Reports: None


Hx Tobacco Use: Yes


Smoking Status (MU): Light Every Day Tobacco Smoker


Type: Cigarettes


Amount Used/How Often: 1/2 ppd for 40 years


Have You Smoked in the Last Year: Yes





Review of Systems


Positive: Chills.  Negative: Fever


Positive: Abdominal Pain - Diffuse, worse on right, Vomiting, Diarrhea, Nausea.

  Negative: Other - Negative blood in stool


Negative: dysuria, hematuria


Negative: Other - Negative SI


All Other Systems Reviewed And Are Negative: Yes





Physical Exam





- Summary


Physical Exam Summary: 





Constitutional: Well-developed, Well-nourished, Alert. (-) Distressed


Skin: Warm, Dry


HENT: Normocephalic; Atraumatic


Eyes: Conjunctiva normal


Neck: Musculoskeletal ROM normal neck. (-) JVD, (-) Stridor, (-) Nuchal rigidity


Cardio: Rhythm regular, rate normal, Heart sounds normal; Intact distal pulses; 

Radial pulses are 2+ and symmetric. (-) Murmur


Pulmonary/Chest wall: Effort normal. (-) Respiratory distress, (-) Wheezes, (-) 

Rales


Abd: Soft, (-) Distension, (-) Guarding, (-) Rebound. RLQ tenderness


Musculoskeletal: (-) Edema


Lymph: (-) Cervical adenopathy


Neuro: Alert, Oriented x3


Psych: Mood and affect Normal


Triage Information Reviewed: Yes


Vital Signs On Initial Exam: 


 Initial Vitals











Temp Pulse Resp BP Pulse Ox


 


 97.5 F   87   19   125/83   95 


 


 02/24/20 12:22  02/24/20 12:22  02/24/20 12:22  02/24/20 12:22  02/24/20 12:22











Vital Signs Reviewed: Yes





Procedures





- Sedation


Patient Received Moderate/Deep Sedation with Procedure: No





Diagnostics





- Vital Signs


 Vital Signs











  Temp Pulse Resp BP Pulse Ox


 


 02/24/20 13:56   64   116/90  98


 


 02/24/20 13:52   69    98


 


 02/24/20 12:22  97.5 F  87  19  125/83  95














- Laboratory


Result Diagrams: 


 02/24/20 14:10





 02/24/20 14:10


Lab Statement: Any lab studies that have been ordered have been reviewed, and 

results considered in the medical decision making process.





- CT


  ** Abdomen/Pelvis CT


CT Interpretation Completed By: Radiologist


Summary of CT Findings: Abdomen/Pelvis CT IMPRESSION:  #.  Hepatosteatosis.  #. 

Post cholecystectomy.  #. No recurrent or new suspicious renal lesion evident. 

Unchanged 0.4 cm nonobstructing.  stone at the midpole of the RIGHT kidney. 

Negative for hydronephrosis.  #. While the appendix is not discretely visualized

, there is no inflammatory change in the.  right lower quadrant or region of 

the tip of the cecum to suggest presence of an acute.  inflammatory process.  #

. No acute pathologic process of the gastrointestinal tract evident.  #. 

Negative for lymphadenopathy or suspicious bone lesions.  Reviewed by Dr. Carcamo.





Re-Evaluation





- Re-Evaluation


  ** First Eval


Re-Evaluation Time: 18:13


Change: Improved


Comment: At 18:13, patient reports no further episodes of vomiting.





  ** Second Eval


Re-Evaluation Time: 18:29


Change: Unchanged


Comment: At 18:29, patient feels comfortable going home.





Abdominal Pain Fem Course/Dx





- Course


Course Of Treatment: 68 y/o F w hx IBS p/w n/v/d.  - well appearing, abdomen 

soft. VSS. labs notable for no leukocytosis, normal electrolytes.  Patient did 

have episode of vomiting and diarrhea in the ED.  Patient was given Zofran 

which not help, wasn't given Reglan which improved her symptoms.  Patient's CT 

scan does not show any obvious cause for diarrhea, suspect that she could have 

a viral syndrome or IBS exacerbation. No recent hopsitalizations or abx, lower 

suspicion c diff.  - will give IVF, PO trial and reassess. Ambulating safely in 

ED does not feel weak.





- Diagnoses


Provider Diagnoses: 


 Nausea, Vomiting, Diarrhea








Discharge ED





- Sign-Out/Discharge


Documenting (check all that apply): Patient Departure - Discharge





- Discharge Plan


Condition: Stable


Disposition: HOME


Prescriptions: 


Metoclopramide TAB* [Reglan TAB*] 5 mg PO Q8H PRN 4 Days #12 tab MDD 3


 PRN Reason: Nausea


Patient Education Materials:  Acute Nausea and Vomiting (ED), Acute Diarrhea (ED

)


Referrals: 


Max Syed MD [Primary Care Provider] - 


Additional Instructions: 


You were seen in emergency department for nausea vomiting and diarrhea. Please 

drink lots of fluids including water or Gatorade. Please return to emergency 

department if you have worsening pain, continued vomiting and diarrhea and 

unable to drink fluids, continued fevers or if you're concerned. Please take 

reglan as needed every 8 hours or vomiting.


If any lab studies are completed at time of discharge, you'll be called with 

the relevant results.


Please follow up with her primary care doctor in next 1-2 days.


It was a pleasure taking care of you today!





- Billing Disposition and Condition


Condition: STABLE


Disposition: Home





- Attestation Statements


Document Initiated by Chris: Yes


Documenting Mooseibe: Pascale Paredes


Provider For Whom Chris is Documenting (Include Credential): Dipti Carcamo MD


Scribe Attestation: 


I, Pascale Paredes, scribed for Dipti Carcamo MD on 02/24/20 at 1838. 


Scribe Documentation Reviewed: Yes


Provider Attestation: 


The documentation as recorded by the Pascale segovia accurately reflects 

the service I personally performed and the decisions made by me, Dipti Carcamo MD


Status of Scribe Document: Viewed

## 2020-02-24 NOTE — XMS REPORT
Transition of Care

 Created on:2019



Patient:DORYS LARIOS

Sex:Female

:1950

External Reference #:[id number]





Demographics







 Address  [address] [place] [address]

 

 Home Phone  [phone]

 

 Preferred Language  Unknown

 

 Marital Status  Not  or 

 

 [employer] Affiliation  Unknown

 

 Race  White

 

 Additional Race(s)  Unknown

 

 Ethnic Group  Not  or 









Author







 Organization  [facility]









Support







 Name  Relationship  Address  Phone

 

 Unavailable  Unavailable  Unavailable  Unavailable









Care Team Providers







 Name  Role  Phone

 

 Karey Dawkins  Primary Care Physician  Unavailable









Allergies, Adverse Reactions, Alerts







 Allergy  Code  CodeSystem  Reaction  Severity  Criticality  Status  Start



 Substance              Date



               



               

 

         Moderate      



               



               







Medications







 Medication  Medication  Medication  Start  Stop  Route  Dose  Status  Fill



   Code  CodeSystem  Date  Date        Instructions



                 



                 



                 

 

 desvenlafaxine  8622143  RxNorm  2019-  oral  50 mg  active        for 
30



 succinate      8-  11-20    tablet    day(s)



             extended    



             release    



             24 hr    



                 



                 

 

 alprazolam  [id number]  RxNorm  2019-  oral  0.5 mg 1  active  Take 1 
tablet



         10-19    tablet     three times



             three    a day as



             times a    needed for 30



             day    day(s)



                 



                 

 

 desvenlafaxine  [id number]  RxNorm  2019-  oral  25 mg  completed        
for 30



 succinate      7-10  08-22    tablet    day(s)



             extended    



             release    



             24 hr    



                 



                 

 

 desvenlafaxine  [id number]  RxNorm  2018-  oral  100 mg  completed        
for 30



 succinate      2-  04-11    tablet    day(s)



             extended    



             release    



             24 hr    



                 



                 

 

 alprazolam  [id number]  RxNorm  2019-  oral  0.5 mg 1  completed   1 tablet



       4-17  05-17    tablet    three times a



             three    day  for 30



             times a    day(s)



             day    



                 



                 

 

 alprazolam  [id number]  RxNorm  2019-  oral  0.5 mg 1  completed   1 tablet



       8-14  09-13    tablet    three times a



             three    day  for 30



             times a    day(s)



             day    



                 



                 

 

 mirtazapine  [id number]  RxNorm  2018-  oral  7.5 mg  completed        for 
30



       2-13  04-11    tablet    day(s)



                 



                 



                 

 

 alprazolam  448066  RxNorm  2019-  oral  0.5 mg  completed        for 
30



       3-21  04-17    tablet    day(s)



                 



                 



                 

 

 alprazolam  [id number]  RxNorm  2019-  oral  0.5 mg 1  completed   1 tablet



       -  06-21    tablet    three times a



             three    day as needed



             times a    for 30 day(s)



             day    



                 



                 

 

 alprazolam  410315  RxNorm    2019-  oral  0.5 mg 1  completed  Take 1 
tablet



       7-10  08-09    tablet     three times



             three    a day as



             times a    needed for 30



             day    day(s)



                 



                 

 

 mirtazapine  [id number]  RxNorm  2019-  oral  7.5 mg 1  active   1 tablet  
at



       4-11  10-08    tablet    bedtime  for



             at    30 day(s)



             bedtime    



                 



                 

 

 desvenlafaxine  5063014  RxNorm  2019-  oral  100 mg 1  completed   1 
tablet



 succinate      4-11  07-10    tablet    once a day



             extended    for 30 day(s)



             release    



             24 hr    



             once a    



             day    



                 



                 







Problems







 Problem Name  Code  CodeSystem  Alternate  Alternate  Start  End  Status  
Narrative



       Code  CodeSystem  Date  Date    



                 



                 



                 

 

  Nicotine  21195679  SNOMED-CT          Active  



 dependence,          3-27      



 unspecified,                



 uncomplicated                



                 

 

  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  



 depressive          3-22      



 disorder,                



 current episode                



 severe without                



 psychotic                



 symptoms                



                 

 

  Recurrent  20871320  SNOMED-CT          Active  



 depressive          3-22      



 disorder,                



 current episode                



 severe without                



 psychotic                



 symptoms                



                 







Relevant diagnostic tests/laboratory data Narrative

No Information



Procedures







 Procedure  Code  CodeSystem  Target  Date of  Status  Service  Device  Device  
Device



 Name      Site  Procedure    Delivery  Code  Name  UID



             Location      



                   



                   

 

 Psychotherap  [id number]  SNOMED-CT   ()  2019  complete  Mental      



 y, 31  88        d  Health-      



 minutes with            [facility]      



 patient when            [address]      



 with an            [facility]      



 evaluation            Street,      



 and            [facility],      



 service            [id number]      



 (List            [id number]      



 separately                  



 in addition                  



 to the code                  



 for primary                  



 procedure)                  



                   



                   

 

 Psychotherap  [id number]  SNOMED-CT   ()  2019  complete  Mental      



 y, 46  5        d  Health-      



 minutes with            [facility]      



 patient            [address],      



             [id number]      



             [id number]      



                   



                   

 

 Psychotherap  [id number]  SNOMED-CT   ()  2019  complete  Mental      



 y, 46  04        d  Health-      



 minutes with            [facility]      



 patient            [address],      



             [id number]      



             [id number]      



                   



                   

 

 Psychotherap  [id number]  SNOMED-CT   ()  2019-10-02  complete  Mental      



 y, 46  04        d  Health-      



 minutes with            [facility]      



 patient            [address],      



             [id number]      



             [id number]      



                   



                   

 

 Psychotherap  [id number]  SNOMED-CT   ()  2019-10-31  complete  Mental      



 y, 46  04        d  Health-      



 minutes with            [address],      



             [id number]      



             [id number]      



                   



                   

 

 Psychotherap  [id number]  SNOMED-CT   ()  2019-11-15  complete  Mental      



 y, 46  04        d  Health-      



 minutes with            [address],      



             [id number]      



             [id number]      



                   



                   

 

 Psychotherap  [id number]  SNOMED-CT   ()  2019  complete  Mental      



 y, 46  04        d  Health-      



 minutes with            [address],      



             [id number]      



             [id number]      



                   



                   

 

 Psychotherap  [id number]  SNOMED-CT   ()  2019  complete  Mental      



 y, 46  04        d  Health-      



 minutes with            [address],      



             [id number]      



             [id number]      



                   



                   

 

 Psychotherap  [id number]  SNOMED-CT   ()  2019  complete  Mental      



 y, 46  04        d  Health-      



 minutes with            [address],      



             [id number]      



             [id number]      



                   



                   

 

 Psychotherap  [id number]  SNOMED-CT   ()  2019  complete  Mental      



 y, 46  04        d  Health-      



 minutes with            [address],      



             [id number]      



             [id number]      



                   



                   

 

 Psychotherap  [id number]  SNOMED-CT   ()  2019  complete  Mental      



 y, 46  04        d  Health-      



 minutes with            [address],      



             [id number]      



             [id number]      



                   



                   

 

 Psychotherap  [id number]  SNOMED-CT   ()  2019  complete  Mental      



 y, 46  04        d  Health-      



 minutes with            [address],      



             [id number]      



             [id number]      



                   



                   

 

 Psychotherap  [id number]  SNOMED-CT   ()  2019  complete  Mental      



 y, 46  04        d  Health-      



 minutes with            [address],      



             [id number]      



             [id number]      



                   



                   

 

 Psychotherap  [id number]  SNOMED-CT   ()  2019  complete  Mental      



 y, 46  04        d  Health-      



 minutes with            [address],      



             [id number]      



             [id number]      



                   



                   

 

 Psychotherap  [id number]  SNOMED-CT   ()  2019  complete  Mental      



 y, 46  04        d  Health-      



 minutes with            [facility]      



 patient            [address],      



             [id number]      



             [id number]      



                   



                   

 

 Psychotherap  [id number]  SNOMED-CT   ()  2019-10-16  complete  Mental      



 y, 46  04        d  Health-      



 minutes with            [facility]      



 patient            [address],      



             [id number]      



             [id number]      



                   



                   

 

 Psychotherap  [id number]  SNOMED-CT   ()  2019  complete  Mental      



 y, 46  04        d  Health-      



 minutes with            [facility]      



 patient            [address],      



             [id number]      



             [phone]      



                   



                   

 

 Psychotherap  [id number]  SNOMED-CT   ()  2019  complete  Mental      



 y, 46  04        d  Health-      



 minutes with            [facility]      



 patient            [address],      



             [id number]      



             [id number]      



                   



                   

 

 Psychotherap  [id number]  SNOMED-CT   ()  2019  complete  Mental      



 y, 46  04        d  Health-      



 minutes with            [facility]      



 patient            [address],      



             [id number]      



             [id number]      



                   



                   

 

 Group  [id number]  SNOMED-CT   ()  2019  complete  Mental      



 psychotherap  8        d  Health-      



 y (other            [facility]      



 than of a            [address],      



             [id number]      



             [phone]      



                   



                   

 

 Office or  [id number]  SNOMED-CT   ()  2019  complete  Mental      



 other  7        d  Health-      



 outpatient            [facility]      



 visit for            [address]            [id number]      



 patient,            [id number]      



 which                  



 requires at                  



 least 2 of                  



 these 3 key                  



 components:                  



 An expanded                  



 problem                  



 focused                  



 history; An                  



 expanded                  



 problem                  



 focused                  



 examination;                  



 Medical                  



 decision                  



 making of                  



 low                  



                   

 

 Office or  [id number]  SNOMED-CT   ()  2019  complete  Mental      



 other  7        d  Health-      



 outpatient            [facility]      



 visit for            [address],      



 established            [id number]      



 patient,            [id number]      



 which                  



 requires at                  



 least 2 of                  



 these 3 key                  



 components:                  



 An expanded                  



 problem                  



 focused                  



 history; An                  



 expanded                  



 problem                  



 focused                  



 examination;                  



 Medical                  



 decision                  



 making of                  



 low                  



                   

 

 Office or  [id number]  SNOMED-CT   ()  2019-07-10  complete  Mental      



 other  7        d  Health-      



 outpatient            [facility]      



 visit for            [address],      



 established            [id number]      



 patient,            [id number]      



 which                  



 requires at                  



 least 2 of                  



 these 3 key                  



 components:                  



 An expanded                  



 problem                  



 focused                  



 history; An                  



 expanded                  



 problem                  



 focused                  



 examination;                  



 Medical                  



 decision                  



 making of                  



 low                  



                   

 

 Office or  [id number]  SNOMED-CT   ()  2019  complete  Mental      



 other  7        d  Health-      



 outpatient            [facility]      



 visit for            [address],      



 established            [id number]      



 patient,            [id number]      



 which                  



 requires at                  



 least 2 of                  



 these 3 key                  



 components:                  



 An expanded                  



 problem                  



 focused                  



 history; An                  



 expanded                  



 problem                  



 focused                  



 examination;                  



 Medical                  



 decision                  



 making of                  



 low                  



                   

 

 Office or  [id number]  SNOMED-CT   ()  2019  complete  Mental      



 other  7        d  Health-      



 outpatient            [facility]      



 visit for            [address],      



 established            [id number]      



 patient,            [id number]      



 which                  



 requires at                  



 least 2 of                  



 these 3 key                  



 components:                  



 An expanded                  



 problem                  



 focused                  



 history; An                  



 expanded                  



 problem                  



 focused                  



 examination;                  



 Medical                  



 decision                  



 making of                  



 low                  



                   

 

 Office or  [id number]  SNOMED-CT   ()  2019-10-10  complete  Mental      



 other  6        d  Health-      



 outpatient            [facility]      



 visit for            [address],      



 established            [id number]      



 patient,            [id number]      



 which                  



 requires at                  



 least 2 of                  



 these 3 key                  



 components:                  



 A problem                  



 focused                  



 history; A                  



 problem                  



 focused                  



 examination;                  



 [employer]                  



 erin medical                  



 decision                  



 making.                  



 Counselin                  



                   



                   

 

 Office or  [id number]  SNOMED-CT   ()  2019  complete  Mental      



 other  6        d  Health-      



 outpatient            [facility]      



 visit for            [address],      



 established            [id number]      



 patient,            [id number]      



 which                  



 requires at                  



 least 2 of                  



 these 3 key                  



 components:                  



 A problem                  



 focused                  



 history; A                  



 problem                  



 focused                  



 examination;                  



 [employer]                  



 erin medical                  



 decision                  



 making.                  



 Counselin                  



                   



                   

 

 Office or  [id number]  SNOMED-CT   ()  2019  complete  Mental      



 other  6        d  Health-      



 outpatient            [facility]      



 visit for            [address],      



 established            [id number]      



 patient,            [id number]      



 which                  



 requires at                  



 least 2 of                  



 these 3 key                  



 components:                  



 A problem                  



 focused                  



 history; A                  



 problem                  



 focused                  



 examination;                  



 [employer]                  



 erin medical                  



 decision                  



 making.                  



 [place]                  



                   



                   

 

     SNOMED-CT   ()  2019  complete  Mental      



           d  [address],      



             [id number]      



             [id number]      



                   



                   

 

     SNOMED-CT   ()  2019  complete  Mental      



           d  [address],      



             [id number]      



             [id number]      



                   



                   

 

     SNOMED-CT   ()  2019  complete  Mental      



           d  [address],      



             [id number]      



             [id number]      



                   



                   

 

     SNOMED-CT   ()  2019  complete  Mental      



           [address],      



             [id number]      



             [id number]      



                   



                   

 

     SNOMED-CT   ()  2019  complete  Mental      



           [address],      



             [id number]      



             [phone]      



                   



                   







Encounters/Encounter Diagnoses







 Encounter  Encounter  Diagnosis  Diagnosis  Diagnosis  Date of  Service



 Name  Code  Code  Name  CodeSystem  Diagnosis  Delivery



             Location



             

 

     [id number]  Recurrent  SNOMED-CT    Behavioral



       depressive      Health



       disorder,      Clinic  , ,



       current      ,



       episode      



       severe      



       without      



       psychotic      



       symptoms      



             







Vital Signs

No Information



Social History







 Element Description  Description  Start  End  Code  CodeSystem  AdditionalInfo



     Date  Date      



             



             



             

 

 SexAssignedAtBirth  Female  -0    F  AdministrativeGender  



             



             



             



             







Hospital Discharge Instructions







Reason For Referral







Medical Equipment

    FDA





Assessments